# Patient Record
Sex: MALE | Race: WHITE | NOT HISPANIC OR LATINO | Employment: OTHER | URBAN - METROPOLITAN AREA
[De-identification: names, ages, dates, MRNs, and addresses within clinical notes are randomized per-mention and may not be internally consistent; named-entity substitution may affect disease eponyms.]

---

## 2023-04-04 LAB
ALANINE AMINOTRANSFERASE (SGPT) (U/L) IN SER/PLAS: 10 U/L (ref 10–52)
ALBUMIN (G/DL) IN SER/PLAS: 4.3 G/DL (ref 3.4–5)
ALKALINE PHOSPHATASE (U/L) IN SER/PLAS: 57 U/L (ref 33–136)
ANION GAP IN SER/PLAS: 14 MMOL/L (ref 10–20)
ASPARTATE AMINOTRANSFERASE (SGOT) (U/L) IN SER/PLAS: 15 U/L (ref 9–39)
BILIRUBIN TOTAL (MG/DL) IN SER/PLAS: 0.7 MG/DL (ref 0–1.2)
CALCIUM (MG/DL) IN SER/PLAS: 9.1 MG/DL (ref 8.6–10.3)
CARBON DIOXIDE, TOTAL (MMOL/L) IN SER/PLAS: 26 MMOL/L (ref 21–32)
CHLORIDE (MMOL/L) IN SER/PLAS: 106 MMOL/L (ref 98–107)
CHOLESTEROL (MG/DL) IN SER/PLAS: 117 MG/DL (ref 0–199)
CHOLESTEROL IN HDL (MG/DL) IN SER/PLAS: 51 MG/DL
CHOLESTEROL/HDL RATIO: 2.3
CREATININE (MG/DL) IN SER/PLAS: 1.15 MG/DL (ref 0.5–1.3)
GFR MALE: 67 ML/MIN/1.73M2
GLUCOSE (MG/DL) IN SER/PLAS: 95 MG/DL (ref 74–99)
LDL: 54 MG/DL (ref 0–99)
POTASSIUM (MMOL/L) IN SER/PLAS: 4 MMOL/L (ref 3.5–5.3)
PROTEIN TOTAL: 6.4 G/DL (ref 6.4–8.2)
SODIUM (MMOL/L) IN SER/PLAS: 142 MMOL/L (ref 136–145)
THYROTROPIN (MIU/L) IN SER/PLAS BY DETECTION LIMIT <= 0.05 MIU/L: 2.2 MIU/L (ref 0.44–3.98)
TRIGLYCERIDE (MG/DL) IN SER/PLAS: 59 MG/DL (ref 0–149)
UREA NITROGEN (MG/DL) IN SER/PLAS: 20 MG/DL (ref 6–23)
VLDL: 12 MG/DL (ref 0–40)

## 2023-10-23 PROBLEM — E78.2 MIXED HYPERLIPIDEMIA: Status: ACTIVE | Noted: 2023-10-23

## 2023-10-23 PROBLEM — I10 ESSENTIAL (PRIMARY) HYPERTENSION: Status: ACTIVE | Noted: 2023-10-23

## 2023-10-23 PROBLEM — R94.31 ABNORMAL ECG: Status: ACTIVE | Noted: 2023-10-23

## 2023-10-23 PROBLEM — R00.2 PALPITATIONS: Status: ACTIVE | Noted: 2023-10-23

## 2023-10-23 PROBLEM — B00.9 HERPESVIRAL INFECTION, UNSPECIFIED: Status: ACTIVE | Noted: 2017-09-29

## 2023-10-23 RX ORDER — MIRABEGRON 25 MG/1
TABLET, FILM COATED, EXTENDED RELEASE ORAL
COMMUNITY
End: 2024-04-30 | Stop reason: ALTCHOICE

## 2023-10-23 RX ORDER — TRIAMTERENE/HYDROCHLOROTHIAZID 37.5-25 MG
1 TABLET ORAL DAILY
COMMUNITY
End: 2024-04-30 | Stop reason: ALTCHOICE

## 2023-10-23 RX ORDER — PANTOPRAZOLE SODIUM 40 MG/1
1 TABLET, DELAYED RELEASE ORAL
COMMUNITY
Start: 2023-08-23 | End: 2024-04-30 | Stop reason: ALTCHOICE

## 2023-10-23 RX ORDER — LEVOTHYROXINE SODIUM 25 UG/1
TABLET ORAL
COMMUNITY
Start: 2021-11-10 | End: 2023-11-30

## 2023-10-23 RX ORDER — LISINOPRIL 20 MG/1
1 TABLET ORAL DAILY
COMMUNITY
Start: 2018-07-18 | End: 2024-02-23

## 2023-10-23 RX ORDER — DULOXETIN HYDROCHLORIDE 60 MG/1
CAPSULE, DELAYED RELEASE ORAL
COMMUNITY
Start: 2014-12-29 | End: 2024-02-23

## 2023-10-23 RX ORDER — TADALAFIL 10 MG/1
1 TABLET ORAL DAILY
COMMUNITY
End: 2024-04-30 | Stop reason: ALTCHOICE

## 2023-10-23 RX ORDER — ASPIRIN 325 MG
1 TABLET ORAL DAILY
COMMUNITY
End: 2024-04-30 | Stop reason: ALTCHOICE

## 2023-10-23 RX ORDER — GLIMEPIRIDE 4 MG/1
TABLET ORAL
COMMUNITY
End: 2024-04-30 | Stop reason: ALTCHOICE

## 2023-10-23 RX ORDER — ATORVASTATIN CALCIUM TRIHYDRATE 20 MG/1
1 TABLET, FILM COATED ORAL DAILY
COMMUNITY
Start: 2012-04-26 | End: 2024-04-30 | Stop reason: ALTCHOICE

## 2023-10-23 RX ORDER — ACYCLOVIR 400 MG/1
TABLET ORAL
COMMUNITY
Start: 2017-09-29

## 2023-10-23 RX ORDER — METFORMIN HYDROCHLORIDE 1000 MG/1
TABLET ORAL
COMMUNITY
Start: 2011-07-06 | End: 2023-12-26

## 2023-10-23 RX ORDER — LANSOPRAZOLE 30 MG/1
CAPSULE, DELAYED RELEASE ORAL
COMMUNITY
End: 2024-04-30 | Stop reason: ALTCHOICE

## 2023-10-26 ENCOUNTER — OFFICE VISIT (OUTPATIENT)
Dept: PRIMARY CARE | Facility: CLINIC | Age: 73
End: 2023-10-26
Payer: MEDICARE

## 2023-10-26 ENCOUNTER — LAB (OUTPATIENT)
Dept: LAB | Facility: LAB | Age: 73
End: 2023-10-26
Payer: COMMERCIAL

## 2023-10-26 VITALS
HEIGHT: 69 IN | BODY MASS INDEX: 32.58 KG/M2 | SYSTOLIC BLOOD PRESSURE: 124 MMHG | WEIGHT: 220 LBS | DIASTOLIC BLOOD PRESSURE: 80 MMHG | HEART RATE: 74 BPM

## 2023-10-26 DIAGNOSIS — Z79.4 TYPE 2 DIABETES MELLITUS WITHOUT COMPLICATION, WITH LONG-TERM CURRENT USE OF INSULIN (MULTI): Primary | ICD-10-CM

## 2023-10-26 DIAGNOSIS — E78.2 MIXED HYPERLIPIDEMIA: ICD-10-CM

## 2023-10-26 DIAGNOSIS — E11.9 TYPE 2 DIABETES MELLITUS WITHOUT COMPLICATION, WITH LONG-TERM CURRENT USE OF INSULIN (MULTI): ICD-10-CM

## 2023-10-26 DIAGNOSIS — K21.9 GASTROESOPHAGEAL REFLUX DISEASE WITHOUT ESOPHAGITIS: ICD-10-CM

## 2023-10-26 DIAGNOSIS — I10 ESSENTIAL (PRIMARY) HYPERTENSION: ICD-10-CM

## 2023-10-26 DIAGNOSIS — E11.9 TYPE 2 DIABETES MELLITUS WITHOUT COMPLICATION, WITH LONG-TERM CURRENT USE OF INSULIN (MULTI): Primary | ICD-10-CM

## 2023-10-26 DIAGNOSIS — Z79.4 TYPE 2 DIABETES MELLITUS WITHOUT COMPLICATION, WITH LONG-TERM CURRENT USE OF INSULIN (MULTI): ICD-10-CM

## 2023-10-26 DIAGNOSIS — R41.3 MEMORY LOSS: ICD-10-CM

## 2023-10-26 PROCEDURE — 99213 OFFICE O/P EST LOW 20 MIN: CPT | Performed by: INTERNAL MEDICINE

## 2023-10-26 PROCEDURE — 3044F HG A1C LEVEL LT 7.0%: CPT | Performed by: INTERNAL MEDICINE

## 2023-10-26 PROCEDURE — 3074F SYST BP LT 130 MM HG: CPT | Performed by: INTERNAL MEDICINE

## 2023-10-26 PROCEDURE — 1159F MED LIST DOCD IN RCRD: CPT | Performed by: INTERNAL MEDICINE

## 2023-10-26 PROCEDURE — 3079F DIAST BP 80-89 MM HG: CPT | Performed by: INTERNAL MEDICINE

## 2023-10-26 PROCEDURE — 84443 ASSAY THYROID STIM HORMONE: CPT

## 2023-10-26 PROCEDURE — 36415 COLL VENOUS BLD VENIPUNCTURE: CPT

## 2023-10-26 PROCEDURE — 80053 COMPREHEN METABOLIC PANEL: CPT

## 2023-10-26 PROCEDURE — 83036 HEMOGLOBIN GLYCOSYLATED A1C: CPT

## 2023-10-26 PROCEDURE — 3048F LDL-C <100 MG/DL: CPT | Performed by: INTERNAL MEDICINE

## 2023-10-26 PROCEDURE — 1036F TOBACCO NON-USER: CPT | Performed by: INTERNAL MEDICINE

## 2023-10-26 PROCEDURE — 4010F ACE/ARB THERAPY RXD/TAKEN: CPT | Performed by: INTERNAL MEDICINE

## 2023-10-26 PROCEDURE — 80061 LIPID PANEL: CPT

## 2023-10-26 RX ORDER — ATORVASTATIN CALCIUM 20 MG/1
20 TABLET, FILM COATED ORAL DAILY
COMMUNITY
End: 2024-01-31

## 2023-10-26 RX ORDER — BLOOD-GLUCOSE SENSOR
EACH MISCELLANEOUS
Qty: 1 EACH | Refills: 3 | Status: SHIPPED | OUTPATIENT
Start: 2023-10-26 | End: 2023-11-09

## 2023-10-26 RX ORDER — DONEPEZIL HYDROCHLORIDE 5 MG/1
5 TABLET, FILM COATED ORAL NIGHTLY
COMMUNITY

## 2023-10-26 NOTE — PROGRESS NOTES
"Subjective   Patient ID: Romie Iyer is a 73 y.o. male who presents for Follow-up (Follow up after seeing Neurology).    HPI dm  Htn  Memory   With wife today   Basically at home   Not active     HAD ALL VACCINES     Sugars are     Review of Systems  Dementia  Htn  Gerd  Lipids  depression    Objective   /80 (BP Location: Left arm, Patient Position: Sitting, BP Cuff Size: Adult)   Pulse 74   Ht 1.753 m (5' 9\")   Wt 99.8 kg (220 lb)   BMI 32.49 kg/m²     Physical Exam  Obese  Card rrr  Pulm cta  Abd soft nt bs plus   Assessment/Plan   Diagnoses and all orders for this visit:  Type 2 diabetes mellitus without complication, with long-term current use of insulin (CMS/[facility])  Comments:  dm eye exam due   i did look at sugars seem ok  Orders:  -     Comprehensive metabolic panel; Future  -     Hemoglobin A1C; Future  -     Lipid Panel; Future  -     TSH with reflex to Free T4 if abnormal; Future  -     blood-glucose sensor (FreeStyle Imani 3 Sensor) device; Aqs directed  Essential (primary) hypertension  -     Comprehensive metabolic panel; Future  -     Hemoglobin A1C; Future  -     Lipid Panel; Future  -     TSH with reflex to Free T4 if abnormal; Future  Mixed hyperlipidemia  -     Comprehensive metabolic panel; Future  -     Hemoglobin A1C; Future  -     Lipid Panel; Future  -     TSH with reflex to Free T4 if abnormal; Future  Memory loss  -     Comprehensive metabolic panel; Future  -     Hemoglobin A1C; Future  -     Lipid Panel; Future  -     TSH with reflex to Free T4 if abnormal; Future  Gastroesophageal reflux disease without esophagitis  ok       "

## 2023-10-27 LAB
ALBUMIN SERPL BCP-MCNC: 4.7 G/DL (ref 3.4–5)
ALP SERPL-CCNC: 75 U/L (ref 33–136)
ALT SERPL W P-5'-P-CCNC: 13 U/L (ref 10–52)
ANION GAP SERPL CALC-SCNC: 15 MMOL/L (ref 10–20)
AST SERPL W P-5'-P-CCNC: 16 U/L (ref 9–39)
BILIRUB SERPL-MCNC: 0.6 MG/DL (ref 0–1.2)
BUN SERPL-MCNC: 32 MG/DL (ref 6–23)
CALCIUM SERPL-MCNC: 9.6 MG/DL (ref 8.6–10.6)
CHLORIDE SERPL-SCNC: 104 MMOL/L (ref 98–107)
CHOLEST SERPL-MCNC: 151 MG/DL (ref 0–199)
CHOLESTEROL/HDL RATIO: 3.1
CO2 SERPL-SCNC: 28 MMOL/L (ref 21–32)
CREAT SERPL-MCNC: 1.22 MG/DL (ref 0.5–1.3)
EST. AVERAGE GLUCOSE BLD GHB EST-MCNC: 143 MG/DL
GFR SERPL CREATININE-BSD FRML MDRD: 63 ML/MIN/1.73M*2
GLUCOSE SERPL-MCNC: 141 MG/DL (ref 74–99)
HBA1C MFR BLD: 6.6 %
HDLC SERPL-MCNC: 49.3 MG/DL
LDLC SERPL CALC-MCNC: 90 MG/DL
NON HDL CHOLESTEROL: 102 MG/DL (ref 0–149)
POTASSIUM SERPL-SCNC: 4.7 MMOL/L (ref 3.5–5.3)
PROT SERPL-MCNC: 7.2 G/DL (ref 6.4–8.2)
SODIUM SERPL-SCNC: 142 MMOL/L (ref 136–145)
TRIGL SERPL-MCNC: 60 MG/DL (ref 0–149)
TSH SERPL-ACNC: 2.23 MIU/L (ref 0.44–3.98)
VLDL: 12 MG/DL (ref 0–40)

## 2023-11-02 ENCOUNTER — TELEPHONE (OUTPATIENT)
Dept: PRIMARY CARE | Facility: CLINIC | Age: 73
End: 2023-11-02
Payer: MEDICARE

## 2023-11-02 DIAGNOSIS — E11.9 TYPE 2 DIABETES MELLITUS WITHOUT COMPLICATION, WITHOUT LONG-TERM CURRENT USE OF INSULIN (MULTI): Primary | ICD-10-CM

## 2023-11-09 ENCOUNTER — ANCILLARY PROCEDURE (OUTPATIENT)
Dept: RADIOLOGY | Facility: CLINIC | Age: 73
End: 2023-11-09
Payer: MEDICARE

## 2023-11-09 ENCOUNTER — OFFICE VISIT (OUTPATIENT)
Dept: PRIMARY CARE | Facility: CLINIC | Age: 73
End: 2023-11-09
Payer: MEDICARE

## 2023-11-09 VITALS — HEART RATE: 82 BPM | DIASTOLIC BLOOD PRESSURE: 80 MMHG | RESPIRATION RATE: 12 BRPM | SYSTOLIC BLOOD PRESSURE: 124 MMHG

## 2023-11-09 DIAGNOSIS — R52 PAIN: Primary | ICD-10-CM

## 2023-11-09 DIAGNOSIS — R52 PAIN: ICD-10-CM

## 2023-11-09 DIAGNOSIS — M25.552 LEFT HIP PAIN: ICD-10-CM

## 2023-11-09 DIAGNOSIS — G89.29 CHRONIC LEFT-SIDED LOW BACK PAIN WITH LEFT-SIDED SCIATICA: ICD-10-CM

## 2023-11-09 DIAGNOSIS — M54.42 CHRONIC LEFT-SIDED LOW BACK PAIN WITH LEFT-SIDED SCIATICA: ICD-10-CM

## 2023-11-09 PROCEDURE — 3044F HG A1C LEVEL LT 7.0%: CPT | Performed by: NURSE PRACTITIONER

## 2023-11-09 PROCEDURE — 99213 OFFICE O/P EST LOW 20 MIN: CPT | Performed by: NURSE PRACTITIONER

## 2023-11-09 PROCEDURE — 1159F MED LIST DOCD IN RCRD: CPT | Performed by: NURSE PRACTITIONER

## 2023-11-09 PROCEDURE — 73502 X-RAY EXAM HIP UNI 2-3 VIEWS: CPT | Mod: RT

## 2023-11-09 PROCEDURE — 3048F LDL-C <100 MG/DL: CPT | Performed by: NURSE PRACTITIONER

## 2023-11-09 PROCEDURE — 3074F SYST BP LT 130 MM HG: CPT | Performed by: NURSE PRACTITIONER

## 2023-11-09 PROCEDURE — 1036F TOBACCO NON-USER: CPT | Performed by: NURSE PRACTITIONER

## 2023-11-09 PROCEDURE — 73502 X-RAY EXAM HIP UNI 2-3 VIEWS: CPT | Mod: RIGHT SIDE | Performed by: RADIOLOGY

## 2023-11-09 PROCEDURE — 72110 X-RAY EXAM L-2 SPINE 4/>VWS: CPT | Performed by: RADIOLOGY

## 2023-11-09 PROCEDURE — 72110 X-RAY EXAM L-2 SPINE 4/>VWS: CPT | Mod: FY

## 2023-11-09 PROCEDURE — 3079F DIAST BP 80-89 MM HG: CPT | Performed by: NURSE PRACTITIONER

## 2023-11-09 PROCEDURE — 4010F ACE/ARB THERAPY RXD/TAKEN: CPT | Performed by: NURSE PRACTITIONER

## 2023-11-09 RX ORDER — BLOOD-GLUCOSE SENSOR
EACH MISCELLANEOUS
Qty: 9 EACH | Refills: 3 | Status: SHIPPED | OUTPATIENT
Start: 2023-11-09

## 2023-11-09 RX ORDER — BLOOD-GLUCOSE TRANSMITTER
EACH MISCELLANEOUS
Qty: 1 EACH | Refills: 0 | Status: SHIPPED | OUTPATIENT
Start: 2023-11-09

## 2023-11-09 RX ORDER — BLOOD-GLUCOSE,RECEIVER,CONT
EACH MISCELLANEOUS
Qty: 1 EACH | Refills: 0 | Status: SHIPPED | OUTPATIENT
Start: 2023-11-09

## 2023-11-09 ASSESSMENT — ENCOUNTER SYMPTOMS
GASTROINTESTINAL NEGATIVE: 1
RESPIRATORY NEGATIVE: 1
CONSTITUTIONAL NEGATIVE: 1
CARDIOVASCULAR NEGATIVE: 1

## 2023-11-09 NOTE — TELEPHONE ENCOUNTER
Dexcom has not been sent - can you send to [facility]? In chart Not sure if it is G4 or G5? Please send today to [facility].

## 2023-11-09 NOTE — PROGRESS NOTES
Subjective   Patient ID: Romie Iyer is a 73 y.o. male.    HPI  Acute visit for dr lara  Reason for visit - follow up fall  10 days ago here with wife ana  He was going out back door leading to the [place] and   neuropathy made him miss the step and landed on his left hip  Wife fani is here and she thought better but then notes  'walking in the grocery and was sitting on the edge of the fridge' cause he said his hip still  hurt   And c/o  back pain chronic tho  Waxing and waining'  He had no limp on entering the exam room      Review of Systems   Constitutional: Negative.    Respiratory: Negative.     Cardiovascular: Negative.    Gastrointestinal: Negative.    Skin: Negative.      Visit Vitals  /80   Pulse 82   Resp 12      Objective   Physical Exam  Vitals and nursing note reviewed.   Constitutional:       Appearance: Normal appearance.   HENT:      Head: Normocephalic.   Eyes:      Pupils: Pupils are equal, round, and reactive to light.   Cardiovascular:      Rate and Rhythm: Normal rate and regular rhythm.      Heart sounds: Normal heart sounds.   Pulmonary:      Effort: Pulmonary effort is normal.      Breath sounds: Normal breath sounds.   Musculoskeletal:         General: Tenderness present. No swelling.      Comments: Neg straight leg   PPP   Skin:     General: Skin is warm and dry.   Neurological:      General: No focal deficit present.      Mental Status: He is alert and oriented to person, place, and time. Mental status is at baseline.   Psychiatric:         Mood and Affect: Mood normal.         Behavior: Behavior normal.         Thought Content: Thought content normal.         Judgment: Judgment normal.       Problem List Items Addressed This Visit    None  Visit Diagnoses         Codes    Pain    -  Primary R52    Relevant Orders    Referral to Physical Therapy    XR lumbar spine complete 4+ views (Completed)    XR hip right 2 or 3 views (Completed)    Left hip pain     M25.552    Chronic  left-sided low back pain with left-sided sciatica     M54.42, G89.29

## 2023-11-09 NOTE — TELEPHONE ENCOUNTER
Ann Marie says the insurance will cover either one of the G4 or G5. As long as its sent to [facility], the insurance will cover

## 2023-11-15 ENCOUNTER — TELEPHONE (OUTPATIENT)
Dept: PRIMARY CARE | Facility: CLINIC | Age: 73
End: 2023-11-15
Payer: MEDICARE

## 2023-11-15 NOTE — TELEPHONE ENCOUNTER
----- Message from Laura L Seaver, APRN-CNP sent at 11/14/2023  7:01 PM EST -----  Please call the patient regarding his abnormal result. Minimal changes normal for age. Start the PT I already ordered call if no help. Adal vidal

## 2023-11-22 ENCOUNTER — EVALUATION (OUTPATIENT)
Dept: PHYSICAL THERAPY | Facility: CLINIC | Age: 73
End: 2023-11-22
Payer: COMMERCIAL

## 2023-11-22 DIAGNOSIS — R26.89 BALANCE PROBLEM: ICD-10-CM

## 2023-11-22 DIAGNOSIS — R52 PAIN: Primary | ICD-10-CM

## 2023-11-22 PROCEDURE — 97161 PT EVAL LOW COMPLEX 20 MIN: CPT | Mod: GP | Performed by: PHYSICAL THERAPIST

## 2023-11-22 PROCEDURE — 97110 THERAPEUTIC EXERCISES: CPT | Mod: GP | Performed by: PHYSICAL THERAPIST

## 2023-11-22 ASSESSMENT — PAIN - FUNCTIONAL ASSESSMENT: PAIN_FUNCTIONAL_ASSESSMENT: 0-10

## 2023-11-22 ASSESSMENT — ENCOUNTER SYMPTOMS
OCCASIONAL FEELINGS OF UNSTEADINESS: 1
LOSS OF SENSATION IN FEET: 1
DEPRESSION: 0

## 2023-11-22 ASSESSMENT — PATIENT HEALTH QUESTIONNAIRE - PHQ9
2. FEELING DOWN, DEPRESSED OR HOPELESS: NOT AT ALL
1. LITTLE INTEREST OR PLEASURE IN DOING THINGS: NOT AT ALL
SUM OF ALL RESPONSES TO PHQ9 QUESTIONS 1 AND 2: 0

## 2023-11-22 ASSESSMENT — PAIN SCALES - GENERAL: PAINLEVEL_OUTOF10: 2

## 2023-11-22 NOTE — PROGRESS NOTES
Physical Therapy    Physical Therapy Lumbar/Hip Spine Evaluation    Patient Name: Romie Iyer  MRN: [id number]  Today's Date: 11/22/2023  Time Calculation  Start Time: 1629  Stop Time: 1740  Time Calculation (min): 71 min    Current Problem  Problem List Items Addressed This Visit             ICD-10-CM    Pain - Primary R52    Relevant Orders    Follow Up In Physical Therapy    Balance problem R26.89    Relevant Orders    Follow Up In Physical Therapy          Precautions  Precautions  Precautions Comment: hx of falls, neuropathy, short term memory loss       Pain  Pain Assessment: 0-10  Pain Score: 2  Pain at worst: 7  Location of pain: L hip>L knee     SUBJECTIVE:   Chief complaint:  L hip (LB?) and L knee pain   Hx of cortisone injection in knee 3/21/23  Lacks balance overall   Recent fall going into home   Neuropathy made him miss the step and landed on his left hip  Son present for eval. He notes that the pt has mentioned previous L hip pain   Would like to work on stair negotiation   +N/T (neuropathy in bilateral feet due to DM)  -B/B  -Cough/sneeze/strain    Imaging:  XR lumbar and hip 11/9/23:    FINDINGS:   Lumbar spine, five views       There is no fracture. There is minimal anterolisthesis of L4 on L5.   There is mild facet disease at L4-5 and L5-S1. There is mild   spondylitic change at the same levels..       Mild spondylosis and facet disease lower lumbar spine.   Minimal anterolisthesis of L4 on        FINDINGS:   Right hip, two views       There is no fracture. There is no dislocation. The right hip joint is   unremarkable        Normal radiographs of the right hip     Aggravating factors:  Stairs, walking, transfers    Alleviating factors:  Hot packs    Prior level of function:  Previously independent with all functional activity    Functional limitations:  Amb, transfers, stairs     Home setup:  Stairs to basement   One step to enter     Work:  Retired-[employer]     Patients  goal:  Improve strength and balance     Prior tx:  No recent tx     Objective:    Lower Extremity Strength: (WNL unless documented below)   MMT 5/5 max  RIGHT LEFT   Hip Flexion 4+ 3+ pain    Hip Extension 3+ 3+   Hip Abduction 4 4- pain    Hip Adduction 3- 3-   Knee Extension 4+ 4+   Knee Flexion 4+ 4+   Ankle DF 4+ 4+   Ankle PF 4+ 4+     Lower Extremity ROM: (WNL unless documented below)   ROM in Degrees  RIGHT LEFT   Hip Flexion  95* pain    Hip Extension     Hip Abduction     Hip Adduction   Mod loss, pain    Hip ER     Hip IR     Knee Extension 0 0   Knee Flexion 130* 130*     Lower Extremity Flexibility: (WNL unless documented below)   Flexibility  RIGHT LEFT   Quad     Hamstring  40* 39*   Hip flexor      Piriformis   Mod loss, pain    ITB      Gastroc      Soleus        Lumbar ROM:   Flexion WNL, unsteady with rising    Extension Significant loss, central LBP     RIGHT LEFT   Side Bend WNl WNL      Jl Lumbar repeated movements:   Pretest Symptoms:    RFIS NT due to time    MARTIN NT due to time            Special tests: (WNL unless documented below)   HIP RIGHT LEFT   Fadie  +   Scour  +   Esteban  +     Dermatomes: decreased sensation bilateral feet due to neuropathy  Palpation: TTP over greater trochanter    Outcome Measure:  Tinetti  Sitting Balance: Steady, safe  Arises: Able, uses arms to help  Attempts to Arise: Able to arise, one attempt  Immediate Standing Balance (First 5 Seconds): Steady but uses walker or other support  Standing Balance: Steady but wide stance, uses cane or other support  Nudged: Steady without walker or other support  Eyes Closed: Steady  Turned 360 Degrees: Steadiness: Steady  Turned 360 Degrees: Continuity of Steps: Discontinuous steps  Sitting Down: Unsafe (Misjudges distance, falls into chair)  Balance Score: 10  Initiation of Gait: No hesitancy  Step Height: R Swing Foot: Right foot complete clears floor  Step Length: R Swing Foot: Does not pass left stance foot with  step  Step Height: L Swing Foot: Left foot complete clears floor  Step Length: L Swing Foot: Passes right stance foot  Step Symmetry: Right and left step appear equal  Step Continuity: Steps appear continuous  Path: Straight without walking aid  Trunk: No sway but flexion of knees or back or spreads arms out while walking  Walking Time: Heels almost touching while walking  Gait Score: 10  Total Score: 20 Medium Fall Risk       TREATMENT:  Initial evaluation completed. Issued and reviewed HEP with pt that included:  Access Code: [email or web address]  URL: [email or web address]  Date: 11/22/2023  Prepared by: Aziza Tavarez    Exercises  - Standing Romberg to 3/4 Tandem Stance  - 1 x daily - 7 x weekly - 2 reps - 30 seconds hold  - Sit to Stand Without Arm Support  - 1 x daily - 7 x weekly - 1-2 sets - 10 reps  - Seated Isometric Hip Adduction with Ball  - 1 x daily - 7 x weekly - 2-3 sets - 10 reps - 3 seconds  hold  - Seated Hip Abduction with Resistance  - 1 x daily - 7 x weekly - 2-3 sets - 10 reps  - Beginner Bridge  - 1 x daily - 7 x weekly - 2-3 sets - 10 reps  - Supine Lower Trunk Rotation  - 1 x daily - 7 x weekly - 2-3 sets - 10 reps    Patient Education  - How to Prevent Falls    Attempted standing hip exercises but too painful.     ASSESSEMENT  The pt presents with signs and symptoms consistent with the Physical Therapy diagnosis of L hip pain>L knee pain, questionable LBP (vs isolated hip pain) and hx of fall.   Pt demonstrates bilateral LE weakness and decreased ROM in L hip with reported pain. He also had TTP over the lateral aspect of hip including greater trochanter. Difficulty in determining if pts hip pain is related to any LB px due to poor historian. Nonetheless we will continues to assess this. He is also a medium fall risk based on hx and Tinetti score.  Pt will benefit from skilled physical therapy to reduce impairments in order to return to prior level of function, reduce pain,  increase strength and ROM and improve overall posture.   The physical therapy prognosis is good for the patient to achieve their goals.   The pt tolerated therapy treatment today well with no adverse effects.  Barriers to therapy include:  memory loss, transportation (pt currently not driving and relies on family)     PLAN  The pt will be seen 1-2 time(s) a week for 6-8 weeks.      The pt (and pt's son) has been educated about the risks and benefits of physical therapy including manual therapy treatments and gives consent for treatment.     The patient will benefit from physical therapy treatment to include: therapeutic exercises, therapeutic activities, neurological re-education, manual therapy, modalities, and a home exercise program.       Goals:  Active       PT Problem       Reduce pain at worst to 2/10 with all functional and recreational activity.        Start:  11/22/23            Increase by > or = 1/2 mm grade to improve stepping up on step at home, perform transfers without increased pain/compensation         Start:  11/22/23            Increase ROM/flexibility to WFL to perform daily functional activities including transfers, stair negotiation       Start:  11/22/23            Tinetti score > 24 pts to display reduced and low risk for falls.        Start:  11/22/23            Patient will demonstrate independence (with assist from family) in home program for support of progression        Start:  11/22/23

## 2023-11-29 ENCOUNTER — TREATMENT (OUTPATIENT)
Dept: PHYSICAL THERAPY | Facility: CLINIC | Age: 73
End: 2023-11-29
Payer: COMMERCIAL

## 2023-11-29 DIAGNOSIS — R26.89 BALANCE PROBLEM: ICD-10-CM

## 2023-11-29 DIAGNOSIS — R52 PAIN: ICD-10-CM

## 2023-11-29 PROCEDURE — 97110 THERAPEUTIC EXERCISES: CPT | Mod: GP,CQ

## 2023-11-29 ASSESSMENT — PAIN - FUNCTIONAL ASSESSMENT: PAIN_FUNCTIONAL_ASSESSMENT: 0-10

## 2023-11-29 ASSESSMENT — PAIN SCALES - GENERAL: PAINLEVEL_OUTOF10: 1

## 2023-11-29 NOTE — PROGRESS NOTES
Physical Therapy Treatment    Patient Name: Romie Iyer  MRN: [id number]  Today's Date: 11/29/2023  Time Calculation  Start Time: 1700  Stop Time: 1743  Time Calculation (min): 43 min    Current Problem:  Problem List Items Addressed This Visit             ICD-10-CM    Pain R52    Balance problem R26.89       Subjective   General:   L hip is sore with Wb'ing.   L knee is feeling good today.   HEP is going well.     Pain:  Pain Assessment: 0-10  Pain Score: 1  Pain Location: Hip  Pain Orientation: Left    Precautions:  Precautions  Precautions Comment: hx of falls, neuropathy, short term memory loss    Objective   No objective measures taken this visit    Treatment:  Therapeutic exercise  Nustep L4 x 5 min  Seated BL HSS x 1 min  Seated BL piriformis stretch x 1 min  Seated marches x 1 min  Sit to stand from elevated plinth 2x10   HR/TR x10   Step up fwd BL 4'' x10  Reverse step up/down 4'' x5  LAQ 1# 2x10    Neuromuscular Re-education   L AE step up x10 , UE support   AE NBOS x 1 min       Manual       Modalities      Assessment   Pt fatigued with session, requiring seated rest breaks throughout session. Voiced some mild L hip pain with Wb'ing exercises but tolerable and able to complete.    Unsteadiness with AE actfiviites with LOB when stepping up, requiring UE support and min assist from therapist. Voiced feeling good post tx but fatigued.     Plan    Continue to progress POC as tolerated by patient to improve strength, mobility and overall function    Goals:  Active       PT Problem       Reduce pain at worst to 2/10 with all functional and recreational activity.        Start:  11/22/23            Increase by > or = 1/2 mm grade to improve stepping up on step at home, perform transfers without increased pain/compensation         Start:  11/22/23            Increase ROM/flexibility to WFL to perform daily functional activities including transfers, stair negotiation       Start:  11/22/23            Tinetti score  > 24 pts to display reduced and low risk for falls.        Start:  11/22/23            Patient will demonstrate independence (with assist from family) in home program for support of progression        Start:  11/22/23

## 2023-11-30 DIAGNOSIS — E03.9 HYPOTHYROIDISM, UNSPECIFIED TYPE: Primary | ICD-10-CM

## 2023-11-30 RX ORDER — LEVOTHYROXINE SODIUM 25 UG/1
TABLET ORAL
Qty: 90 TABLET | Refills: 3 | Status: SHIPPED | OUTPATIENT
Start: 2023-11-30

## 2023-12-04 ENCOUNTER — TREATMENT (OUTPATIENT)
Dept: PHYSICAL THERAPY | Facility: CLINIC | Age: 73
End: 2023-12-04
Payer: COMMERCIAL

## 2023-12-04 DIAGNOSIS — R26.89 BALANCE PROBLEM: ICD-10-CM

## 2023-12-04 DIAGNOSIS — R52 PAIN: ICD-10-CM

## 2023-12-04 PROCEDURE — 97110 THERAPEUTIC EXERCISES: CPT | Mod: GP,CQ

## 2023-12-04 ASSESSMENT — PAIN - FUNCTIONAL ASSESSMENT: PAIN_FUNCTIONAL_ASSESSMENT: 0-10

## 2023-12-04 ASSESSMENT — PAIN SCALES - GENERAL: PAINLEVEL_OUTOF10: 0 - NO PAIN

## 2023-12-06 ENCOUNTER — TREATMENT (OUTPATIENT)
Dept: PHYSICAL THERAPY | Facility: CLINIC | Age: 73
End: 2023-12-06
Payer: COMMERCIAL

## 2023-12-06 DIAGNOSIS — R26.89 BALANCE PROBLEM: ICD-10-CM

## 2023-12-06 DIAGNOSIS — R52 PAIN: ICD-10-CM

## 2023-12-06 PROCEDURE — 97110 THERAPEUTIC EXERCISES: CPT | Mod: GP,CQ

## 2023-12-06 PROCEDURE — 97112 NEUROMUSCULAR REEDUCATION: CPT | Mod: GP,CQ

## 2023-12-06 ASSESSMENT — PAIN SCALES - GENERAL: PAINLEVEL_OUTOF10: 0 - NO PAIN

## 2023-12-06 ASSESSMENT — PAIN - FUNCTIONAL ASSESSMENT: PAIN_FUNCTIONAL_ASSESSMENT: 0-10

## 2023-12-06 NOTE — PROGRESS NOTES
Physical Therapy Treatment    Patient Name: Romie Iyer  MRN: [id number]  Today's Date: 12/6/2023  Time Calculation  Start Time: 1740  Stop Time: 1825  Time Calculation (min): 45 min    Current Problem:  Problem List Items Addressed This Visit             ICD-10-CM    Pain R52    Balance problem R26.89       Subjective   General:   Pt reports L hip is feeling better overall. No pain today but does get reminders of pain at times.   Not waking up d/t hip pain recently.     Pain:  Pain Assessment: 0-10  Pain Score: 0 - No pain  Pain Location: Hip  Pain Orientation: Left    Precautions:  Precautions  Precautions Comment: hx of falls, neuropathy, short term memory loss    Objective   No objective measures taken this visit    Treatment:  Therapeutic exercise  Nustep L4 x 5 min  Seated BL HSS x 1 min  Seated BL piriformis stretch x 1 min  Seated marches x 1 min  Sit to stand from elevated plinth 2x10   HR/TR 2 x10   Step up fwd BL 4'' x10  Reverse step up/down 4'' R x10, L x5 discontinue d/t pain   LAQ 1# 2x10  Ascend/descend stairs in lobby x 2 flights , 1 UE support , SBA  Lat walking in middle of gym, CGA held   Monster walk in middle of gym, CGA held    Neuromuscular Re-education   L AE step up x10 , UE support   AE NBOS x 1 min   Olive (small)-> AE-> olive-> AE along railing x2 L back and forth   Negotiating obstacles in lobby (waving in/out of chairs), SBA     Manual       Modalities      Assessment   Pt overall tolerated tx well, demonstrating improved endurance and strength with exercises. Did well negotiating stairs in lobby with reciprocal pattern.   Pt also did well with negotiating obstacles with good awareness to surrounding objects.  L hip pain with reverse step up so discontinued.   Plan    Continue to progress POC as tolerated by patient to improve strength, mobility and overall function    Goals:  Active       PT Problem       Reduce pain at worst to 2/10 with all functional and recreational activity.         Start:  11/22/23            Increase by > or = 1/2 mm grade to improve stepping up on step at home, perform transfers without increased pain/compensation         Start:  11/22/23            Increase ROM/flexibility to WFL to perform daily functional activities including transfers, stair negotiation       Start:  11/22/23            Tinetti score > 24 pts to display reduced and low risk for falls.        Start:  11/22/23            Patient will demonstrate independence (with assist from family) in home program for support of progression        Start:  11/22/23

## 2023-12-11 ENCOUNTER — TREATMENT (OUTPATIENT)
Dept: PHYSICAL THERAPY | Facility: CLINIC | Age: 73
End: 2023-12-11
Payer: COMMERCIAL

## 2023-12-11 DIAGNOSIS — R52 PAIN: ICD-10-CM

## 2023-12-11 DIAGNOSIS — R26.89 BALANCE PROBLEM: ICD-10-CM

## 2023-12-11 PROCEDURE — 97140 MANUAL THERAPY 1/> REGIONS: CPT | Mod: GP,CQ

## 2023-12-11 PROCEDURE — 97110 THERAPEUTIC EXERCISES: CPT | Mod: GP,CQ

## 2023-12-11 ASSESSMENT — PAIN SCALES - GENERAL: PAINLEVEL_OUTOF10: 0 - NO PAIN

## 2023-12-11 ASSESSMENT — PAIN - FUNCTIONAL ASSESSMENT: PAIN_FUNCTIONAL_ASSESSMENT: 0-10

## 2023-12-11 NOTE — PROGRESS NOTES
Physical Therapy Treatment    Patient Name: Romie Iyer  MRN: [id number]  Today's Date: 12/11/2023  Time Calculation  Start Time: 1750  Stop Time: 1830  Time Calculation (min): 40 min    Current Problem:  Problem List Items Addressed This Visit             ICD-10-CM    Pain R52    Balance problem R26.89         Subjective   General:   Pt reports L hip is feeling better, has not noticed pain recently.   Feels like balance and strength is improving as well.     Pain:  Pain Assessment: 0-10  Pain Score: 0 - No pain  Pain Location: Hip  Pain Orientation: Left    Precautions:  Precautions  Precautions Comment: hx of falls, neuropathy, short term memory loss    Objective   No objective measures taken this visit    Treatment:  Therapeutic exercise  Nustep L4 x 5 min/Upright bike seat 5 L2 x 5 min   Seated BL HSS x 1 min  Seated BL piriformis stretch x 1 min  Seated marches x 1 min  Sit to stand from elevated plinth 2x10   HR/TR 2 x10   Step up fwd BL 6'' 2x10  Reverse step up/down 4'' R x10, L x10  Ascend/descend stairs in lobby x 2 flights , 1 UE support , SBA- held today   Lat walking in middle of gym 20 ftx 2 , CGA   Monster walk in middle of gym 20 ft x 2  CGA     Neuromuscular Re-education   L AE step up x10 , UE support   AE mod tandem x 1 min   Olive (small)-> AE-> olive-> AE along railing x2 L back and forth   Negotiating obstacles in lobby (waving in/out of chairs), SBA     Manual       Modalities      Assessment   Pt overall progressing well with therex and balance activities. Progressed static balance but pt does demonstrate difficulty and unsteadiness with step ups onto AE, requiring UE support and Min A.  No hip pain with reverse step ups this visit but still demonstrated weakness.  No complaints of pain during or post tx.   Plan    Continue to progress POC as tolerated by patient to improve strength, mobility and overall function    Goals:  Active       PT Problem       Reduce pain at worst to 2/10 with  all functional and recreational activity.        Start:  11/22/23            Increase by > or = 1/2 mm grade to improve stepping up on step at home, perform transfers without increased pain/compensation         Start:  11/22/23            Increase ROM/flexibility to WFL to perform daily functional activities including transfers, stair negotiation       Start:  11/22/23            Tinetti score > 24 pts to display reduced and low risk for falls.        Start:  11/22/23            Patient will demonstrate independence (with assist from family) in home program for support of progression        Start:  11/22/23

## 2023-12-12 ENCOUNTER — CLINICAL SUPPORT (OUTPATIENT)
Dept: PRIMARY CARE | Facility: CLINIC | Age: 73
End: 2023-12-12
Payer: MEDICARE

## 2023-12-12 PROCEDURE — G0009 ADMIN PNEUMOCOCCAL VACCINE: HCPCS | Performed by: INTERNAL MEDICINE

## 2023-12-12 PROCEDURE — 90677 PCV20 VACCINE IM: CPT | Performed by: INTERNAL MEDICINE

## 2023-12-13 ENCOUNTER — TREATMENT (OUTPATIENT)
Dept: PHYSICAL THERAPY | Facility: CLINIC | Age: 73
End: 2023-12-13
Payer: COMMERCIAL

## 2023-12-13 DIAGNOSIS — R26.89 BALANCE PROBLEM: ICD-10-CM

## 2023-12-13 DIAGNOSIS — R52 PAIN: ICD-10-CM

## 2023-12-13 PROCEDURE — 97110 THERAPEUTIC EXERCISES: CPT | Mod: GP,CQ

## 2023-12-13 PROCEDURE — 97112 NEUROMUSCULAR REEDUCATION: CPT | Mod: GP,CQ

## 2023-12-13 ASSESSMENT — PAIN SCALES - GENERAL: PAINLEVEL_OUTOF10: 0 - NO PAIN

## 2023-12-13 ASSESSMENT — PAIN - FUNCTIONAL ASSESSMENT: PAIN_FUNCTIONAL_ASSESSMENT: 0-10

## 2023-12-13 NOTE — PROGRESS NOTES
Physical Therapy Treatment    Patient Name: oRmie Iyer  MRN: [id number]  Today's Date: 12/13/2023  Time Calculation  Start Time: 1750  Stop Time: 1830  Time Calculation (min): 40 min    Current Problem:  Problem List Items Addressed This Visit             ICD-10-CM    Pain R52    Balance problem R26.89           Subjective   General:   Pt reports L hip is feeling better overall. Still feels pain occasionally, apolinar with stairs.   Feels like balance and strength is improving as well, not catching his foot on things as much.     Pain:  Pain Assessment: 0-10  Pain Score: 0 - No pain  Pain Location: Hip  Pain Orientation: Left    Precautions:  Precautions  Precautions Comment: hx of falls, neuropathy, short term memory loss    Objective   No objective measures taken this visit    Treatment:  Therapeutic exercise  Nustep L4 x 5 min/Upright bike seat 5 L2 x 5 min   Seated BL HSS x 1 min  Seated BL piriformis stretch x 1 min  Seated marches x 1 min  Sit to stand from elevated plinth 2x10   HR/TR 2 x10   Step up fwd BL 6'' 2x10  Reverse step up/down 4'' R x10, L x10  Ascend/descend stairs in lobby x 2 flights , 1 UE support , SBA- held today   Lat walking at railing   Monster walk at railing back and forth x 2 L , no UE support YTB   Neuromuscular Re-education   L AE step up 2x10 , min to no UE support   AE mod tandem x 1 min ea   Olive (small)-> AE-> olive-> AE along railing x2 L back and forth       Manual       Modalities      Assessment   Pt continues to progress well with tx. Demonstrated increased strength and improved balance during activitie but still requires CGA-Erickson for unsteadiness and to decrease falls. Difficulty with steps and stepping over objects d/t weakness. No reports of pain during or post tx.     Plan    Continue to progress POC as tolerated by patient to improve strength, mobility and overall function    Goals:  Active       PT Problem       Reduce pain at worst to 2/10 with all functional and  recreational activity.        Start:  11/22/23            Increase by > or = 1/2 mm grade to improve stepping up on step at home, perform transfers without increased pain/compensation         Start:  11/22/23            Increase ROM/flexibility to WFL to perform daily functional activities including transfers, stair negotiation       Start:  11/22/23            Tinetti score > 24 pts to display reduced and low risk for falls.        Start:  11/22/23            Patient will demonstrate independence (with assist from family) in home program for support of progression        Start:  11/22/23

## 2023-12-18 ENCOUNTER — DOCUMENTATION (OUTPATIENT)
Dept: PHYSICAL THERAPY | Facility: CLINIC | Age: 73
End: 2023-12-18
Payer: MEDICARE

## 2023-12-18 ENCOUNTER — APPOINTMENT (OUTPATIENT)
Dept: PHYSICAL THERAPY | Facility: CLINIC | Age: 73
End: 2023-12-18
Payer: COMMERCIAL

## 2023-12-18 NOTE — PROGRESS NOTES
Physical Therapy                 Therapy Communication Note    Patient Name: Romie Iyer  MRN: [id number]  Today's Date: 12/18/2023     Discipline: Physical Therapy    Missed Visit Reason:      Missed Time: Cancel    Comment: Pt cx d/t being ill

## 2023-12-20 ENCOUNTER — TREATMENT (OUTPATIENT)
Dept: PHYSICAL THERAPY | Facility: CLINIC | Age: 73
End: 2023-12-20
Payer: COMMERCIAL

## 2023-12-20 DIAGNOSIS — R26.89 BALANCE PROBLEM: ICD-10-CM

## 2023-12-20 DIAGNOSIS — R52 PAIN: Primary | ICD-10-CM

## 2023-12-20 PROCEDURE — 97112 NEUROMUSCULAR REEDUCATION: CPT | Mod: GP,CQ

## 2023-12-20 PROCEDURE — 97110 THERAPEUTIC EXERCISES: CPT | Mod: GP,CQ

## 2023-12-20 ASSESSMENT — PAIN SCALES - GENERAL: PAINLEVEL_OUTOF10: 0 - NO PAIN

## 2023-12-20 ASSESSMENT — PAIN - FUNCTIONAL ASSESSMENT: PAIN_FUNCTIONAL_ASSESSMENT: 0-10

## 2023-12-20 NOTE — PROGRESS NOTES
Physical Therapy Treatment    Patient Name: Romie Iyer  MRN: [id number]  Today's Date: 12/20/2023  Time Calculation  Start Time: 1705  Stop Time: 1745  Time Calculation (min): 40 min    Current Problem:  Problem List Items Addressed This Visit             ICD-10-CM    Pain - Primary R52    Balance problem R26.89       Subjective   General:   L Hip has been feeling good, no pain.   Feels like balance is improving as well. It helps that he has not been experiencing knee or hip pain recently.   Pain:  Pain Assessment: 0-10  Pain Score: 0 - No pain  Pain Location: Hip  Pain Orientation: Left    Precautions:  Precautions  Precautions Comment: hx of falls, neuropathy, short term memory loss    Objective   No objective measures taken this visit    Treatment:  Therapeutic exercise  Nustep L4 x 5 min/Upright bike seat 5 L2 x 5 min   Seated BL HSS x 1 min  Seated BL piriformis stretch x 1 min  Seated marches x 1.5  min  Sit to stand from elevated plinth 2x10   HR/TR on 1/2 foam 2 x10   Step up fwd BL 6'' 2x10- held   Reverse step up/down 4'' R x10, L x10- held   Ascend/descend stairs in lobby x 3 flights , 1 UE support , SBA  Lat walking at railing   Monster walk at railing back and forth x 2 L , no UE support YTB   Alt fwd lunges x5 1 UE support    Neuromuscular Re-education   L AE step up 2x10 , min to no UE support   AE mod tandem x 1 min ea   Olive (small)-> AE-> olive-> AE along railing x2 L back and forth       Manual       Modalities      Assessment   Pt progressing with strength and balance activities. Does not require nearly as many seated rest breaks than previous sessions. Only required 1 seated rest break the entire session today.  L knee pain with alt lunges so mod to mid, pain free range. Requires cues to decrease UE support on steps d/t pulling himself up with hands vs using LE's. Overall great session.     Plan    Continue to progress POC as tolerated by patient to improve strength, mobility and overall  function    Goals:  Active       PT Problem       Reduce pain at worst to 2/10 with all functional and recreational activity.        Start:  11/22/23            Increase by > or = 1/2 mm grade to improve stepping up on step at home, perform transfers without increased pain/compensation         Start:  11/22/23            Increase ROM/flexibility to WFL to perform daily functional activities including transfers, stair negotiation       Start:  11/22/23            Tinetti score > 24 pts to display reduced and low risk for falls.        Start:  11/22/23            Patient will demonstrate independence (with assist from family) in home program for support of progression        Start:  11/22/23

## 2023-12-25 DIAGNOSIS — E11.65 TYPE 2 DIABETES MELLITUS WITH HYPERGLYCEMIA, WITHOUT LONG-TERM CURRENT USE OF INSULIN (MULTI): Primary | ICD-10-CM

## 2023-12-26 RX ORDER — GLIMEPIRIDE 2 MG/1
2 TABLET ORAL DAILY
Qty: 90 TABLET | Refills: 1 | Status: SHIPPED | OUTPATIENT
Start: 2023-12-26 | End: 2024-03-21 | Stop reason: SDUPTHER

## 2023-12-26 RX ORDER — METFORMIN HYDROCHLORIDE 1000 MG/1
1000 TABLET ORAL DAILY
Qty: 90 TABLET | Refills: 1 | Status: SHIPPED | OUTPATIENT
Start: 2023-12-26 | End: 2024-03-21 | Stop reason: SDUPTHER

## 2023-12-27 ENCOUNTER — TREATMENT (OUTPATIENT)
Dept: PHYSICAL THERAPY | Facility: CLINIC | Age: 73
End: 2023-12-27
Payer: COMMERCIAL

## 2023-12-27 DIAGNOSIS — R26.89 BALANCE PROBLEM: ICD-10-CM

## 2023-12-27 DIAGNOSIS — R52 PAIN: ICD-10-CM

## 2023-12-27 PROCEDURE — 97110 THERAPEUTIC EXERCISES: CPT | Mod: GP | Performed by: PHYSICAL THERAPIST

## 2023-12-27 ASSESSMENT — PAIN SCALES - GENERAL: PAINLEVEL_OUTOF10: 0 - NO PAIN

## 2023-12-27 ASSESSMENT — PAIN - FUNCTIONAL ASSESSMENT: PAIN_FUNCTIONAL_ASSESSMENT: 0-10

## 2023-12-27 NOTE — PROGRESS NOTES
Physical Therapy Treatment    Patient Name: Romie Iyer  MRN: [id number]  Today's Date: 12/27/2023  Time Calculation  Start Time: 1704  Stop Time: 1747  Time Calculation (min): 43 min    Current Problem:  Problem List Items Addressed This Visit             ICD-10-CM    Pain R52    Balance problem R26.89       Subjective   General:   L Hip has been feeling good, no pain.   Denies loss of balance.     Pain:  Pain Assessment: 0-10  Pain Score: 0 - No pain  Pain Location: Hip  Pain Orientation: Left    Precautions:  Precautions  Precautions Comment: hx of falls, neuropathy, short term memory loss    Objective     Lower Extremity Strength: (WNL unless documented below)   MMT 5/5 max  RIGHT LEFT   Hip Flexion 5 4 thigh pain    Hip Extension 4 4   Hip Abduction 4+ 4+   Hip Adduction 4+ 4+   Knee Extension 5 5   Knee Flexion 5 5   Ankle DF 4+ 4+   Ankle PF 4+ 4+      Lower Extremity ROM: (WNL unless documented below)   ROM in Degrees  RIGHT LEFT   Hip Flexion   110 without pain    Hip Extension       Hip Abduction       Hip Adduction    Mod loss, pain    Hip ER       Hip IR          Lower Extremity Flexibility: (WNL unless documented below)   Flexibility  RIGHT LEFT   Quad       Hamstring  32* 32*   Hip flexor        Piriformis    WNL    ITB        Gastroc        Soleus                Outcome Measure:  Tinetti  Sitting Balance: Steady, safe  Arises: Able without using arms  Attempts to Arise: Able to arise, one attempt  Immediate Standing Balance (First 5 Seconds): Steady without walker or other support  Standing Balance: Steady but wide stance, uses cane or other support  Nudged: Steady without walker or other support  Eyes Closed: Unsteady  Turned 360 Degrees: Steadiness: Steady  Turned 360 Degrees: Continuity of Steps: Continuous  Sitting Down: Safe, smooth motion  Balance Score: 14  Initiation of Gait: No hesitancy  Step Height: R Swing Foot: Right foot complete clears floor  Step Length: R Swing Foot: Does not pass  left stance foot with step  Step Height: L Swing Foot: Left foot complete clears floor  Step Length: L Swing Foot: Does not pass right stance foot with step  Step Symmetry: Right and left step appear equal  Step Continuity: Steps appear continuous  Path: Straight without walking aid  Trunk: No sway, no flexion, no use of arms, no walking aid  Walking Time: Heels almost touching while walking  Gait Score: 10  Total Score: 24      Treatment:  Therapeutic exercise  Nustep L4 x 5 min/Upright bike seat 5 L2 x 5 min     Recheck performed     Seated BL HSS x 1 min  Seated BL piriformis stretch x 1 min  Sit to stand from elevated plinth 2x10     Ascend/descend stairs in lobby x 3 flights , 1 UE support , SBA  Lat walking at railing YTB x 2 laps, No UE support   Monster walk at railing back and forth x 2 L , no UE support YTB     HEP updated:   Access Code: [email or web address]  URL: [email or web address]  Date: 12/27/2023  Prepared by: Aziza Tavarez    Exercises  - Seated Hamstring Stretch  - 1 x daily - 7 x weekly - 1 minute hold  - Seated Piriformis Stretch  - 1 x daily - 7 x weekly - 1 minute hold  - Standing Romberg to 3/4 Tandem Stance  - 1 x daily - 7 x weekly - 2 reps - 30 seconds hold  - Sit to Stand Without Arm Support  - 1 x daily - 7 x weekly - 1-2 sets - 10 reps  - Seated Isometric Hip Adduction with Ball  - 1 x daily - 7 x weekly - 2-3 sets - 10 reps - 3 seconds  hold  - Seated Hip Abduction with Resistance  - 1 x daily - 7 x weekly - 2-3 sets - 10 reps  - Beginner Bridge  - 1 x daily - 7 x weekly - 2-3 sets - 10 reps  - Supine Lower Trunk Rotation  - 1 x daily - 7 x weekly - 2-3 sets - 10 reps  - Side Stepping with Resistance at Ankles and Counter Support  - 1 x daily - 3-4 x weekly - 2-3 sets - 10 reps  - Forward and Backward Monster Walk with Resistance at Ankles and Counter Support  - 1 x daily - 3-4 x weekly - 2-3 sets - 10 reps  - Hip Abduction with Resistance Loop  - 1 x daily - 3-4 x weekly  - 1-2 sets - 10 reps  - Hip Extension with Resistance Loop  - 1 x daily - 3-4 x weekly - 1-2 sets - 10 reps  - Standing Hip Flexion with Resistance Loop  - 1 x daily - 3-4 x weekly - 1-2 sets - 10 reps    Patient Education  - How to Prevent Falls    Assessment   Pt demonstrates much improved strength and ROM without the same reported pain as initial eval.  Overall he has made great progression toward functional goals and is appropriate for discharge with continued compliance with updated HEP.     Plan    Discharge to [facility].     Goals:  Active       PT Problem       Reduce pain at worst to 2/10 with all functional and recreational activity.  (Met)       Start:  11/22/23    Expected End:  02/20/24    Resolved:  12/27/23         Increase by > or = 1/2 mm grade to improve stepping up on step at home, perform transfers without increased pain/compensation   (Met)       Start:  11/22/23    Expected End:  02/20/24    Resolved:  12/27/23         Increase ROM/flexibility to WFL to perform daily functional activities including transfers, stair negotiation (Met)       Start:  11/22/23    Expected End:  02/20/24    Resolved:  12/27/23         Tinetti score > 24 pts to display reduced and low risk for falls.  (Progressing)       Start:  11/22/23    Expected End:  02/20/24            Patient will demonstrate independence (with assist from family) in home program for support of progression  (Met)       Start:  11/22/23    Expected End:  02/20/24    Resolved:  12/27/23      Goal Note       Patient will demonstrate independence in home program for support of progression

## 2024-01-31 DIAGNOSIS — E78.2 MIXED HYPERLIPIDEMIA: Primary | ICD-10-CM

## 2024-01-31 RX ORDER — ATORVASTATIN CALCIUM 20 MG/1
TABLET, FILM COATED ORAL
Qty: 90 TABLET | Refills: 3 | Status: SHIPPED | OUTPATIENT
Start: 2024-01-31

## 2024-02-23 DIAGNOSIS — F41.9 ANXIETY: Primary | ICD-10-CM

## 2024-02-23 DIAGNOSIS — I10 ESSENTIAL (PRIMARY) HYPERTENSION: Primary | ICD-10-CM

## 2024-02-23 RX ORDER — LISINOPRIL 20 MG/1
20 TABLET ORAL 2 TIMES DAILY
Qty: 180 TABLET | Refills: 1 | Status: SHIPPED | OUTPATIENT
Start: 2024-02-23 | End: 2024-03-18 | Stop reason: SDUPTHER

## 2024-02-23 RX ORDER — DULOXETIN HYDROCHLORIDE 60 MG/1
60 CAPSULE, DELAYED RELEASE ORAL DAILY
Qty: 90 CAPSULE | Refills: 1 | Status: SHIPPED | OUTPATIENT
Start: 2024-02-23 | End: 2024-03-21 | Stop reason: SDUPTHER

## 2024-03-18 DIAGNOSIS — I10 ESSENTIAL (PRIMARY) HYPERTENSION: ICD-10-CM

## 2024-03-18 RX ORDER — LISINOPRIL 20 MG/1
20 TABLET ORAL 2 TIMES DAILY
Qty: 180 TABLET | Refills: 1 | Status: SHIPPED | OUTPATIENT
Start: 2024-03-18

## 2024-03-21 ENCOUNTER — TELEPHONE (OUTPATIENT)
Dept: PRIMARY CARE | Facility: CLINIC | Age: 74
End: 2024-03-21
Payer: MEDICARE

## 2024-03-21 DIAGNOSIS — F41.9 ANXIETY: ICD-10-CM

## 2024-03-21 DIAGNOSIS — E11.65 TYPE 2 DIABETES MELLITUS WITH HYPERGLYCEMIA, WITHOUT LONG-TERM CURRENT USE OF INSULIN (MULTI): ICD-10-CM

## 2024-03-21 RX ORDER — METFORMIN HYDROCHLORIDE 1000 MG/1
1000 TABLET ORAL DAILY
Qty: 90 TABLET | Refills: 3 | Status: SHIPPED | OUTPATIENT
Start: 2024-03-21

## 2024-03-21 RX ORDER — DULOXETIN HYDROCHLORIDE 60 MG/1
60 CAPSULE, DELAYED RELEASE ORAL DAILY
Qty: 90 CAPSULE | Refills: 3 | Status: SHIPPED | OUTPATIENT
Start: 2024-03-21

## 2024-03-21 RX ORDER — GLIMEPIRIDE 2 MG/1
2 TABLET ORAL DAILY
Qty: 90 TABLET | Refills: 3 | Status: SHIPPED | OUTPATIENT
Start: 2024-03-21

## 2024-03-21 NOTE — TELEPHONE ENCOUNTER
USING [facility] MAIL ORDER - needs all scripts from them .    Glimperide 2mg , Duloxetine, and Metformin all need to be sent to them .  All his scripts will then be from [facility].

## 2024-04-30 ENCOUNTER — OFFICE VISIT (OUTPATIENT)
Dept: PRIMARY CARE | Facility: CLINIC | Age: 74
End: 2024-04-30
Payer: MEDICARE

## 2024-04-30 VITALS
HEART RATE: 76 BPM | BODY MASS INDEX: 32.88 KG/M2 | SYSTOLIC BLOOD PRESSURE: 128 MMHG | HEIGHT: 69 IN | DIASTOLIC BLOOD PRESSURE: 88 MMHG | WEIGHT: 222 LBS

## 2024-04-30 DIAGNOSIS — Z00.00 WELLNESS EXAMINATION: ICD-10-CM

## 2024-04-30 DIAGNOSIS — Z12.5 PROSTATE CANCER SCREENING: ICD-10-CM

## 2024-04-30 DIAGNOSIS — I10 PRIMARY HYPERTENSION: ICD-10-CM

## 2024-04-30 DIAGNOSIS — E78.2 MIXED HYPERLIPIDEMIA: ICD-10-CM

## 2024-04-30 DIAGNOSIS — Z79.4 TYPE 2 DIABETES MELLITUS WITHOUT COMPLICATION, WITH LONG-TERM CURRENT USE OF INSULIN (MULTI): ICD-10-CM

## 2024-04-30 DIAGNOSIS — I10 ESSENTIAL (PRIMARY) HYPERTENSION: Primary | ICD-10-CM

## 2024-04-30 DIAGNOSIS — R41.3 MEMORY LOSS: ICD-10-CM

## 2024-04-30 DIAGNOSIS — K21.9 GASTROESOPHAGEAL REFLUX DISEASE WITHOUT ESOPHAGITIS: ICD-10-CM

## 2024-04-30 DIAGNOSIS — E11.9 TYPE 2 DIABETES MELLITUS WITHOUT COMPLICATION, WITH LONG-TERM CURRENT USE OF INSULIN (MULTI): ICD-10-CM

## 2024-04-30 LAB — POC HEMOGLOBIN A1C: 6.2 % (ref 4.2–6.5)

## 2024-04-30 PROCEDURE — 3074F SYST BP LT 130 MM HG: CPT | Performed by: INTERNAL MEDICINE

## 2024-04-30 PROCEDURE — 3079F DIAST BP 80-89 MM HG: CPT | Performed by: INTERNAL MEDICINE

## 2024-04-30 PROCEDURE — 1159F MED LIST DOCD IN RCRD: CPT | Performed by: INTERNAL MEDICINE

## 2024-04-30 PROCEDURE — 83036 HEMOGLOBIN GLYCOSYLATED A1C: CPT | Performed by: INTERNAL MEDICINE

## 2024-04-30 PROCEDURE — 4010F ACE/ARB THERAPY RXD/TAKEN: CPT | Performed by: INTERNAL MEDICINE

## 2024-04-30 PROCEDURE — 99213 OFFICE O/P EST LOW 20 MIN: CPT | Performed by: INTERNAL MEDICINE

## 2024-04-30 RX ORDER — CARBIDOPA AND LEVODOPA 25; 100 MG/1; MG/1
TABLET ORAL
COMMUNITY
Start: 2024-04-18

## 2024-04-30 RX ORDER — ASPIRIN 81 MG/1
81 TABLET ORAL DAILY
COMMUNITY

## 2024-04-30 NOTE — PROGRESS NOTES
"Subjective   Patient ID: Romie Iyer is a 73 y.o. male who presents for Follow-up (6 MONTH FOLLOW UP).    HPI DOING WELL SLEEP WAS AN ISSUES FOR RLS   SINEMET FOR RLS   SUGARS REVIEWED  AL OK   MOOD IS GOOD   Review of Systems  COLON 6/21/22  Objective   /88   Pulse 76   Ht 1.753 m (5' 9\")   Wt 101 kg (222 lb)   BMI 32.78 kg/m²     Physical Exam  NAD  CARD RRR  PULM CTA  ABD NEG   EXT  NL    Assessment/Plan   Diagnoses and all orders for this visit:  Essential (primary) hypertension  Comments:  GOOD  Orders:  -     TSH with reflex to Free T4 if abnormal; Future  -     Urinalysis with Reflex Culture and Microscopic  -     Albumin , Urine Random; Future  Type 2 diabetes mellitus without complication, with long-term current use of insulin (Multi)  Comments:  CONTROLLED  Orders:  -     TSH with reflex to Free T4 if abnormal; Future  -     Urinalysis with Reflex Culture and Microscopic  -     Albumin , Urine Random; Future  Memory loss  Comments:  FAIR TO STABLE  Orders:  -     TSH with reflex to Free T4 if abnormal; Future  -     Urinalysis with Reflex Culture and Microscopic  -     Albumin , Urine Random; Future  Gastroesophageal reflux disease without esophagitis  Comments:  ALL OK  Orders:  -     TSH with reflex to Free T4 if abnormal; Future  -     Urinalysis with Reflex Culture and Microscopic  -     Albumin , Urine Random; Future  Mixed hyperlipidemia  Comments:  OK  Orders:  -     TSH with reflex to Free T4 if abnormal; Future  -     Urinalysis with Reflex Culture and Microscopic  -     Albumin , Urine Random; Future  Wellness examination  -     CBC; Future  -     Lipid Panel; Future  -     Comprehensive Metabolic Panel; Future  -     TSH with reflex to Free T4 if abnormal; Future  -     Urinalysis with Reflex Culture and Microscopic  -     Albumin , Urine Random; Future  Prostate cancer screening  -     Prostate Specific Antigen, Screen; Future  -     TSH with reflex to Free T4 if abnormal; " Future  -     Urinalysis with Reflex Culture and Microscopic  -     Albumin , Urine Random; Future  Primary hypertension  -     CBC; Future  Other orders  -     Follow Up In Primary Care - Established

## 2024-06-29 DIAGNOSIS — E11.9 TYPE 2 DIABETES MELLITUS WITHOUT COMPLICATION, WITHOUT LONG-TERM CURRENT USE OF INSULIN (MULTI): ICD-10-CM

## 2024-07-01 RX ORDER — BLOOD-GLUCOSE TRANSMITTER
EACH MISCELLANEOUS
Qty: 30 EACH | Refills: 3 | Status: SHIPPED | OUTPATIENT
Start: 2024-07-01

## 2024-08-20 ENCOUNTER — LAB (OUTPATIENT)
Dept: LAB | Facility: LAB | Age: 74
End: 2024-08-20
Payer: MEDICARE

## 2024-08-20 DIAGNOSIS — I10 PRIMARY HYPERTENSION: ICD-10-CM

## 2024-08-20 DIAGNOSIS — Z79.4 TYPE 2 DIABETES MELLITUS WITHOUT COMPLICATION, WITH LONG-TERM CURRENT USE OF INSULIN (MULTI): ICD-10-CM

## 2024-08-20 DIAGNOSIS — I10 ESSENTIAL (PRIMARY) HYPERTENSION: ICD-10-CM

## 2024-08-20 DIAGNOSIS — E78.2 MIXED HYPERLIPIDEMIA: ICD-10-CM

## 2024-08-20 DIAGNOSIS — E11.9 TYPE 2 DIABETES MELLITUS WITHOUT COMPLICATION, WITH LONG-TERM CURRENT USE OF INSULIN (MULTI): ICD-10-CM

## 2024-08-20 DIAGNOSIS — K21.9 GASTROESOPHAGEAL REFLUX DISEASE WITHOUT ESOPHAGITIS: ICD-10-CM

## 2024-08-20 DIAGNOSIS — Z12.5 PROSTATE CANCER SCREENING: ICD-10-CM

## 2024-08-20 DIAGNOSIS — R41.3 MEMORY LOSS: ICD-10-CM

## 2024-08-20 DIAGNOSIS — Z00.00 WELLNESS EXAMINATION: ICD-10-CM

## 2024-08-20 LAB
ALBUMIN SERPL BCP-MCNC: 4.5 G/DL (ref 3.4–5)
ALP SERPL-CCNC: 82 U/L (ref 33–136)
ALT SERPL W P-5'-P-CCNC: 10 U/L (ref 10–52)
ANION GAP SERPL CALC-SCNC: 15 MMOL/L (ref 10–20)
AST SERPL W P-5'-P-CCNC: 17 U/L (ref 9–39)
BILIRUB SERPL-MCNC: 0.9 MG/DL (ref 0–1.2)
BUN SERPL-MCNC: 16 MG/DL (ref 6–23)
CALCIUM SERPL-MCNC: 9.5 MG/DL (ref 8.6–10.6)
CHLORIDE SERPL-SCNC: 104 MMOL/L (ref 98–107)
CHOLEST SERPL-MCNC: 124 MG/DL (ref 0–199)
CHOLESTEROL/HDL RATIO: 2.7
CO2 SERPL-SCNC: 27 MMOL/L (ref 21–32)
CREAT SERPL-MCNC: 1.21 MG/DL (ref 0.5–1.3)
EGFRCR SERPLBLD CKD-EPI 2021: 63 ML/MIN/1.73M*2
ERYTHROCYTE [DISTWIDTH] IN BLOOD BY AUTOMATED COUNT: 13.8 % (ref 11.5–14.5)
GLUCOSE SERPL-MCNC: 162 MG/DL (ref 74–99)
HCT VFR BLD AUTO: 48.5 % (ref 41–52)
HDLC SERPL-MCNC: 46.7 MG/DL
HGB BLD-MCNC: 15.3 G/DL (ref 13.5–17.5)
LDLC SERPL CALC-MCNC: 57 MG/DL
MCH RBC QN AUTO: 26.2 PG (ref 26–34)
MCHC RBC AUTO-ENTMCNC: 31.5 G/DL (ref 32–36)
MCV RBC AUTO: 83 FL (ref 80–100)
NON HDL CHOLESTEROL: 77 MG/DL (ref 0–149)
NRBC BLD-RTO: 0 /100 WBCS (ref 0–0)
PLATELET # BLD AUTO: 271 X10*3/UL (ref 150–450)
POTASSIUM SERPL-SCNC: 4.2 MMOL/L (ref 3.5–5.3)
PROT SERPL-MCNC: 7.1 G/DL (ref 6.4–8.2)
PSA SERPL-MCNC: <0.1 NG/ML
RBC # BLD AUTO: 5.83 X10*6/UL (ref 4.5–5.9)
SODIUM SERPL-SCNC: 142 MMOL/L (ref 136–145)
TRIGL SERPL-MCNC: 102 MG/DL (ref 0–149)
TSH SERPL-ACNC: 3.33 MIU/L (ref 0.44–3.98)
VLDL: 20 MG/DL (ref 0–40)
WBC # BLD AUTO: 8.7 X10*3/UL (ref 4.4–11.3)

## 2024-08-20 PROCEDURE — 85027 COMPLETE CBC AUTOMATED: CPT

## 2024-08-20 PROCEDURE — 82043 UR ALBUMIN QUANTITATIVE: CPT

## 2024-08-20 PROCEDURE — 80061 LIPID PANEL: CPT

## 2024-08-20 PROCEDURE — G0103 PSA SCREENING: HCPCS

## 2024-08-20 PROCEDURE — 80053 COMPREHEN METABOLIC PANEL: CPT

## 2024-08-20 PROCEDURE — 84443 ASSAY THYROID STIM HORMONE: CPT

## 2024-08-20 PROCEDURE — 82570 ASSAY OF URINE CREATININE: CPT

## 2024-08-20 PROCEDURE — 36415 COLL VENOUS BLD VENIPUNCTURE: CPT

## 2024-08-21 LAB
CREAT UR-MCNC: 154.9 MG/DL (ref 20–370)
MICROALBUMIN UR-MCNC: 273.6 MG/L
MICROALBUMIN/CREAT UR: 176.6 UG/MG CREAT

## 2024-08-27 DIAGNOSIS — F41.9 ANXIETY: ICD-10-CM

## 2024-08-27 DIAGNOSIS — E78.2 MIXED HYPERLIPIDEMIA: Primary | ICD-10-CM

## 2024-08-27 DIAGNOSIS — E11.65 TYPE 2 DIABETES MELLITUS WITH HYPERGLYCEMIA, WITHOUT LONG-TERM CURRENT USE OF INSULIN (MULTI): ICD-10-CM

## 2024-08-27 DIAGNOSIS — I10 ESSENTIAL (PRIMARY) HYPERTENSION: ICD-10-CM

## 2024-08-28 RX ORDER — DULOXETIN HYDROCHLORIDE 60 MG/1
60 CAPSULE, DELAYED RELEASE ORAL DAILY
Qty: 90 CAPSULE | Refills: 1 | Status: SHIPPED | OUTPATIENT
Start: 2024-08-28

## 2024-08-28 RX ORDER — PANTOPRAZOLE SODIUM 40 MG/1
40 TABLET, DELAYED RELEASE ORAL
Qty: 90 TABLET | Refills: 1 | Status: SHIPPED | OUTPATIENT
Start: 2024-08-28

## 2024-08-28 RX ORDER — NAPROXEN SODIUM 220 MG/1
81 TABLET, FILM COATED ORAL DAILY
Qty: 90 TABLET | Refills: 1 | Status: SHIPPED | OUTPATIENT
Start: 2024-08-28

## 2024-08-28 RX ORDER — LISINOPRIL 20 MG/1
20 TABLET ORAL 2 TIMES DAILY
Qty: 180 TABLET | Refills: 1 | Status: SHIPPED | OUTPATIENT
Start: 2024-08-28

## 2024-08-28 RX ORDER — METFORMIN HYDROCHLORIDE 1000 MG/1
1000 TABLET ORAL
Qty: 90 TABLET | Refills: 1 | Status: SHIPPED | OUTPATIENT
Start: 2024-08-28

## 2024-08-28 RX ORDER — DONEPEZIL HYDROCHLORIDE 5 MG/1
5 TABLET, FILM COATED ORAL NIGHTLY
Qty: 90 TABLET | Refills: 1 | Status: SHIPPED | OUTPATIENT
Start: 2024-08-28

## 2024-08-28 RX ORDER — GLIMEPIRIDE 2 MG/1
2 TABLET ORAL
Qty: 90 TABLET | Refills: 1 | Status: SHIPPED | OUTPATIENT
Start: 2024-08-28

## 2024-09-18 DIAGNOSIS — E03.9 HYPOTHYROIDISM, UNSPECIFIED TYPE: ICD-10-CM

## 2024-09-18 RX ORDER — LEVOTHYROXINE SODIUM 25 UG/1
TABLET ORAL
Qty: 90 TABLET | Refills: 3 | Status: SHIPPED | OUTPATIENT
Start: 2024-09-18

## 2024-10-29 ENCOUNTER — TELEPHONE (OUTPATIENT)
Dept: PRIMARY CARE | Facility: CLINIC | Age: 74
End: 2024-10-29
Payer: MEDICARE

## 2024-11-05 ENCOUNTER — APPOINTMENT (OUTPATIENT)
Dept: PRIMARY CARE | Facility: CLINIC | Age: 74
End: 2024-11-05
Payer: MEDICARE

## 2024-11-05 VITALS
HEIGHT: 69 IN | OXYGEN SATURATION: 97 % | WEIGHT: 228 LBS | SYSTOLIC BLOOD PRESSURE: 180 MMHG | TEMPERATURE: 97.3 F | BODY MASS INDEX: 33.77 KG/M2 | HEART RATE: 90 BPM | DIASTOLIC BLOOD PRESSURE: 100 MMHG

## 2024-11-05 DIAGNOSIS — E78.2 MIXED HYPERLIPIDEMIA: ICD-10-CM

## 2024-11-05 DIAGNOSIS — E11.40 TYPE 2 DIABETES MELLITUS WITH DIABETIC NEUROPATHY, WITH LONG-TERM CURRENT USE OF INSULIN: ICD-10-CM

## 2024-11-05 DIAGNOSIS — E11.9 TYPE 2 DIABETES MELLITUS WITHOUT COMPLICATION, WITH LONG-TERM CURRENT USE OF INSULIN (MULTI): ICD-10-CM

## 2024-11-05 DIAGNOSIS — Z79.4 TYPE 2 DIABETES MELLITUS WITHOUT COMPLICATION, WITH LONG-TERM CURRENT USE OF INSULIN (MULTI): ICD-10-CM

## 2024-11-05 DIAGNOSIS — R00.2 PALPITATIONS: ICD-10-CM

## 2024-11-05 DIAGNOSIS — K21.9 GASTROESOPHAGEAL REFLUX DISEASE WITHOUT ESOPHAGITIS: ICD-10-CM

## 2024-11-05 DIAGNOSIS — Z00.00 WELLNESS EXAMINATION: ICD-10-CM

## 2024-11-05 DIAGNOSIS — Z00.00 MEDICARE ANNUAL WELLNESS VISIT, SUBSEQUENT: Primary | ICD-10-CM

## 2024-11-05 DIAGNOSIS — Z85.46 PERSONAL HISTORY OF MALIGNANT NEOPLASM OF PROSTATE: ICD-10-CM

## 2024-11-05 DIAGNOSIS — E11.65 TYPE 2 DIABETES MELLITUS WITH HYPERGLYCEMIA, WITHOUT LONG-TERM CURRENT USE OF INSULIN: ICD-10-CM

## 2024-11-05 DIAGNOSIS — M54.42 CHRONIC LEFT-SIDED LOW BACK PAIN WITH LEFT-SIDED SCIATICA: ICD-10-CM

## 2024-11-05 DIAGNOSIS — Z12.5 PROSTATE CANCER SCREENING: ICD-10-CM

## 2024-11-05 DIAGNOSIS — F03.90 DEMENTIA WITHOUT BEHAVIORAL DISTURBANCE, PSYCHOTIC DISTURBANCE, MOOD DISTURBANCE, OR ANXIETY, UNSPECIFIED DEMENTIA SEVERITY, UNSPECIFIED DEMENTIA TYPE: ICD-10-CM

## 2024-11-05 DIAGNOSIS — M25.552 LEFT HIP PAIN: ICD-10-CM

## 2024-11-05 DIAGNOSIS — D12.6 TUBULAR ADENOMA OF COLON: ICD-10-CM

## 2024-11-05 DIAGNOSIS — R41.3 MEMORY LOSS: ICD-10-CM

## 2024-11-05 DIAGNOSIS — G89.29 CHRONIC LEFT-SIDED LOW BACK PAIN WITH LEFT-SIDED SCIATICA: ICD-10-CM

## 2024-11-05 DIAGNOSIS — R26.89 BALANCE PROBLEM: ICD-10-CM

## 2024-11-05 DIAGNOSIS — R52 PAIN: ICD-10-CM

## 2024-11-05 DIAGNOSIS — I10 ESSENTIAL (PRIMARY) HYPERTENSION: ICD-10-CM

## 2024-11-05 DIAGNOSIS — C61 MALIGNANT NEOPLASM OF PROSTATE (MULTI): ICD-10-CM

## 2024-11-05 DIAGNOSIS — M17.12 LOCALIZED OSTEOARTHRITIS OF LEFT KNEE: ICD-10-CM

## 2024-11-05 DIAGNOSIS — Z79.4 TYPE 2 DIABETES MELLITUS WITH DIABETIC NEUROPATHY, WITH LONG-TERM CURRENT USE OF INSULIN: ICD-10-CM

## 2024-11-05 DIAGNOSIS — E66.812 CLASS 2 SEVERE OBESITY DUE TO EXCESS CALORIES WITH SERIOUS COMORBIDITY AND BODY MASS INDEX (BMI) OF 36.0 TO 36.9 IN ADULT: ICD-10-CM

## 2024-11-05 DIAGNOSIS — E66.01 CLASS 2 SEVERE OBESITY DUE TO EXCESS CALORIES WITH SERIOUS COMORBIDITY AND BODY MASS INDEX (BMI) OF 36.0 TO 36.9 IN ADULT: ICD-10-CM

## 2024-11-05 DIAGNOSIS — I72.8 ANEURYSM OF OTHER SPECIFIED ARTERIES: ICD-10-CM

## 2024-11-05 DIAGNOSIS — F41.9 ANXIETY: ICD-10-CM

## 2024-11-05 DIAGNOSIS — B00.9 HERPESVIRAL INFECTION, UNSPECIFIED: ICD-10-CM

## 2024-11-05 DIAGNOSIS — R94.31 ABNORMAL ECG: ICD-10-CM

## 2024-11-05 LAB — POC HEMOGLOBIN A1C: 6.4 % (ref 4.2–6.5)

## 2024-11-05 PROCEDURE — 1170F FXNL STATUS ASSESSED: CPT | Performed by: INTERNAL MEDICINE

## 2024-11-05 PROCEDURE — 3080F DIAST BP >= 90 MM HG: CPT | Performed by: INTERNAL MEDICINE

## 2024-11-05 PROCEDURE — 3048F LDL-C <100 MG/DL: CPT | Performed by: INTERNAL MEDICINE

## 2024-11-05 PROCEDURE — 99397 PER PM REEVAL EST PAT 65+ YR: CPT | Performed by: INTERNAL MEDICINE

## 2024-11-05 PROCEDURE — G0439 PPPS, SUBSEQ VISIT: HCPCS | Performed by: INTERNAL MEDICINE

## 2024-11-05 PROCEDURE — 3060F POS MICROALBUMINURIA REV: CPT | Performed by: INTERNAL MEDICINE

## 2024-11-05 PROCEDURE — 83036 HEMOGLOBIN GLYCOSYLATED A1C: CPT | Performed by: INTERNAL MEDICINE

## 2024-11-05 PROCEDURE — 3008F BODY MASS INDEX DOCD: CPT | Performed by: INTERNAL MEDICINE

## 2024-11-05 PROCEDURE — G0008 ADMIN INFLUENZA VIRUS VAC: HCPCS | Performed by: INTERNAL MEDICINE

## 2024-11-05 PROCEDURE — 1160F RVW MEDS BY RX/DR IN RCRD: CPT | Performed by: INTERNAL MEDICINE

## 2024-11-05 PROCEDURE — 3077F SYST BP >= 140 MM HG: CPT | Performed by: INTERNAL MEDICINE

## 2024-11-05 PROCEDURE — 4010F ACE/ARB THERAPY RXD/TAKEN: CPT | Performed by: INTERNAL MEDICINE

## 2024-11-05 PROCEDURE — 99213 OFFICE O/P EST LOW 20 MIN: CPT | Performed by: INTERNAL MEDICINE

## 2024-11-05 PROCEDURE — 90662 IIV NO PRSV INCREASED AG IM: CPT | Performed by: INTERNAL MEDICINE

## 2024-11-05 PROCEDURE — 1124F ACP DISCUSS-NO DSCNMKR DOCD: CPT | Performed by: INTERNAL MEDICINE

## 2024-11-05 PROCEDURE — 1159F MED LIST DOCD IN RCRD: CPT | Performed by: INTERNAL MEDICINE

## 2024-11-05 RX ORDER — DONEPEZIL HYDROCHLORIDE 10 MG/1
10 TABLET, FILM COATED ORAL NIGHTLY
Qty: 90 TABLET | Refills: 3 | Status: SHIPPED | OUTPATIENT
Start: 2024-11-05

## 2024-11-05 RX ORDER — ATORVASTATIN CALCIUM 20 MG/1
20 TABLET, FILM COATED ORAL DAILY
Qty: 90 TABLET | Refills: 3 | Status: SHIPPED | OUTPATIENT
Start: 2024-11-05

## 2024-11-05 RX ORDER — CETIRIZINE HYDROCHLORIDE 10 MG/1
TABLET, CHEWABLE ORAL DAILY
COMMUNITY

## 2024-11-05 ASSESSMENT — ENCOUNTER SYMPTOMS
UNEXPECTED WEIGHT CHANGE: 0
CHILLS: 0
CHOKING: 0
GASTROINTESTINAL NEGATIVE: 1
ARTHRALGIAS: 0
CHEST TIGHTNESS: 0
APNEA: 0
APPETITE CHANGE: 0
HEADACHES: 0
EYE REDNESS: 0
HEMATOLOGIC/LYMPHATIC NEGATIVE: 1
PALPITATIONS: 0
FACIAL ASYMMETRY: 0
ENDOCRINE NEGATIVE: 1
WHEEZING: 0
PHOTOPHOBIA: 0
EYE ITCHING: 0
FATIGUE: 0
STRIDOR: 0
DIAPHORESIS: 0
SHORTNESS OF BREATH: 0
EYE PAIN: 0
COUGH: 0
NUMBNESS: 0
FEVER: 0
PSYCHIATRIC NEGATIVE: 1
ACTIVITY CHANGE: 0
EYE DISCHARGE: 0
DIZZINESS: 0
LIGHT-HEADEDNESS: 0
ALLERGIC/IMMUNOLOGIC NEGATIVE: 1

## 2024-11-05 ASSESSMENT — ACTIVITIES OF DAILY LIVING (ADL)
DOING_HOUSEWORK: NEEDS ASSISTANCE
MANAGING_FINANCES: TOTAL CARE
BATHING: INDEPENDENT
TAKING_MEDICATION: NEEDS ASSISTANCE
DRESSING: INDEPENDENT
GROCERY_SHOPPING: NEEDS ASSISTANCE

## 2024-11-05 ASSESSMENT — PATIENT HEALTH QUESTIONNAIRE - PHQ9
SUM OF ALL RESPONSES TO PHQ9 QUESTIONS 1 AND 2: 0
2. FEELING DOWN, DEPRESSED OR HOPELESS: NOT AT ALL
1. LITTLE INTEREST OR PLEASURE IN DOING THINGS: NOT AT ALL

## 2024-11-05 NOTE — ASSESSMENT & PLAN NOTE
Orders:    POCT glycosylated hemoglobin (Hb A1C) manually resulted    Referral to Gastroenterology; Future

## 2024-11-05 NOTE — ASSESSMENT & PLAN NOTE
Needs colon 2025  Orders:    POCT glycosylated hemoglobin (Hb A1C) manually resulted    Referral to Gastroenterology; Future

## 2024-11-05 NOTE — ASSESSMENT & PLAN NOTE
good  Orders:    POCT glycosylated hemoglobin (Hb A1C) manually resulted    Referral to Gastroenterology; Future    donepezil (Aricept) 10 mg tablet; Take 1 tablet (10 mg) by mouth once daily at bedtime.

## 2024-11-05 NOTE — ASSESSMENT & PLAN NOTE
Sign see neuro   Orders:    POCT glycosylated hemoglobin (Hb A1C) manually resulted    Referral to Gastroenterology; Future

## 2024-11-05 NOTE — ASSESSMENT & PLAN NOTE
controlled  Orders:    donepezil (Aricept) 10 mg tablet; Take 1 tablet (10 mg) by mouth once daily at bedtime.

## 2024-11-05 NOTE — ASSESSMENT & PLAN NOTE
See psa  Orders:    POCT glycosylated hemoglobin (Hb A1C) manually resulted    Referral to Gastroenterology; Future

## 2024-11-05 NOTE — ASSESSMENT & PLAN NOTE
Stable now   Orders:    POCT glycosylated hemoglobin (Hb A1C) manually resulted    Referral to Gastroenterology; Future

## 2024-11-05 NOTE — PROGRESS NOTES
Subjective   Reason for Visit: Romie Iyer is an 74 y.o. male here for a Medicare Wellness visit.     Past Medical, Surgical, and Family History reviewed and updated in chart.    Reviewed all medications by prescribing practitioner or clinical pharmacist (such as prescriptions, OTCs, herbal therapies and supplements) and documented in the medical record.  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  Stable over all   Needs cataract and colon     PMHX, PSHX, ALL, SOCHX, PRE MED ALL REVIEWED     MMSE 26 30     PHQ 2 NL     NO FALLS NOTED     PREVENTIVE MEDICINE:  Mammogram  Dexa  Psa 2024  Colonoscopy   VACCINES REVIEWED    PSHx: prostatectomy, tonsillectomy, tooth extraction    PAST MEDICAL HISTORY:   PAST MEDICAL HISTORY   Diagnosis Date   Anxiety   Depression   Diabetes mellitus without complication (HCC)   Essential hypertension, benign   GERD (gastroesophageal reflux disease)   Malaise and fatigue   Mixed hyperlipidemia   ABRAN (obstructive sleep apnea)   no machine   Overweight   Paraesophageal hernia 11/09/2022   large   Prostate cancer (HCC)   in remission since 2007   Restless leg syndrome   Short-term memory loss   seeing neurologist   Syncope   2017   Type 2 diabetes mellitus with stage 2 chronic kidney disease (HCC)     PAST SURGICAL HISTORY:   PAST SURGICAL HISTORY   Procedure Laterality Date   CARPAL TUNNEL Bilateral   repaired   COLONOSCOPY SCREENING 06/21/2022   Dr. Crawley   ECHO 01/27/2017   EGD 11/09/2022   large hiatal hernia with Silvio's erosions; Dr. Crawley   EVENT MONITOR 02/09/2017   14 day   KNEE SURGERY HX Left   arthroscopy with cartilage removal/repair   PROSTATECTOMY FIRST STAGE   Transurethral 2007 for Prostate CA   TILT TABLE 01/27/2017   Dr. Tabares   TONSILLECTOMY HX   TOOTH EXTRACTION     FAMILY HISTORY:   FAMILY HISTORY   Problem Relation Age of Onset   Stroke Father   Cancer Mother   colo-rectal   Cancer Maternal Grandfather   colo-rectal   other  (Other- family history of colon cancer (mother diagnosed at age51), paternal uncle (diagnosed at age 70), and maternal grandfather) Other     SOCIAL HISTORY:   Social History     Tobacco Use   Smoking status: Never   Smokeless tobacco: Never   Vaping Use   Vaping Use: Never used   Substance Use Topics   Alcohol use: Not Currently   Drug use: Never     MEDICATIONS:  Current Outpatient Medications   Medication Sig   pantoprazole DR (PROTONIX) 40 mg tablet Take 1 tablet by mouth every morning.   glimepiride (AMARYL) 2 mg tablet Take 2 mg by mouth once daily.   BABY ASPIRIN ORAL Take by mouth once daily.   cyanocobalamin (VITAMIN B-12) 500 mcg tablet Take 1 tablet by mouth once daily.   DULoxetine (CYMBALTA) 60 mg capsule Take 60 mg by mouth once daily.   levothyroxine (SYNTHROID) 25 mcg tablet Take 25 mcg by mouth daily before breakfast.   lisinopril (ZESTRIL, PRINIVIL) 20 mg tablet Take 20 mg by mouth twice daily.   atorvastatin (LIPITOR) 40 mg tablet TAKE 1 TABLET BY MOUTH EVERY DAY (Patient taking differently: Take 20 mg by mouth once daily.)   metFORMIN (GLUCOPHAGE) 1,000 mg tablet Take 1 tablet by mouth twice daily with meals. (Patient taking differently: Take 1,000 mg by mouth daily with dinner.)   atenolol-chlorthalidone (TENORETIC) 50-25 mg per tablet Take 1 tablet by mouth once daily.   (Patient not taking: No sig reported)     No current facility-administered medications for this visit.     ALLERGIES:   ALLERGIES   Allergen Reactions   Seasonal Allergies Shortness of Breath     Patient Care Team:  Ponce Saez MD as PCP - General  Ponce Saez MD as PCP - [employer] PCP     Review of Systems   Constitutional:  Negative for activity change, appetite change, chills, diaphoresis, fatigue, fever and unexpected weight change.   HENT: Negative.     Eyes:  Negative for photophobia, pain, discharge, redness, itching and visual disturbance.   Respiratory:  Negative for apnea, cough, choking, chest  "tightness, shortness of breath, wheezing and stridor.    Cardiovascular:  Negative for chest pain, palpitations and leg swelling.   Gastrointestinal: Negative.    Endocrine: Negative.    Genitourinary: Negative.    Musculoskeletal:  Negative for arthralgias.   Skin: Negative.    Allergic/Immunologic: Negative.    Neurological:  Negative for dizziness, facial asymmetry, light-headedness, numbness and headaches.   Hematological: Negative.    Psychiatric/Behavioral: Negative.         Objective   Vitals:  BP (!) 180/100 (BP Location: Left arm, Patient Position: Sitting, BP Cuff Size: Adult)   Pulse 90   Temp 36.3 °C (97.3 °F) (Temporal)   Ht 1.753 m (5' 9\")   Wt 103 kg (228 lb)   SpO2 97%   BMI 33.67 kg/m²       Physical Exam  Constitutional:       Appearance: Normal appearance. He is obese.   HENT:      Head: Normocephalic and atraumatic.      Right Ear: Tympanic membrane normal.      Left Ear: Tympanic membrane normal.      Nose: Nose normal.   Eyes:      Extraocular Movements: Extraocular movements intact.      Conjunctiva/sclera: Conjunctivae normal.      Pupils: Pupils are equal, round, and reactive to light.   Cardiovascular:      Rate and Rhythm: Normal rate and regular rhythm.      Pulses: Normal pulses.      Heart sounds: Normal heart sounds.   Pulmonary:      Effort: Pulmonary effort is normal.      Breath sounds: Normal breath sounds.   Abdominal:      General: Abdomen is flat. Bowel sounds are normal.      Palpations: Abdomen is soft.   Musculoskeletal:         General: Normal range of motion.      Cervical back: Normal range of motion and neck supple.   Skin:     General: Skin is warm and dry.   Neurological:      General: No focal deficit present.      Mental Status: Mental status is at baseline. He is disoriented.   Psychiatric:         Mood and Affect: Mood normal.         Behavior: Behavior normal.         Thought Content: Thought content normal.         Judgment: Judgment normal.       Ear " Cerumen Removal    Date/Time: 11/6/2024 9:15 AM    Performed by: Ponce Saez MD  Authorized by: Ponce Saez MD    Consent:     Consent obtained:  Verbal    Consent given by:  Patient    Risks, benefits, and alternatives were discussed: yes      Risks discussed:  Bleeding, infection, dizziness, incomplete removal, TM perforation and pain    Alternatives discussed:  Alternative treatment  Universal protocol:     Procedure explained and questions answered to patient or proxy's satisfaction: yes      Relevant documents present and verified: yes      Test results available: yes      Imaging studies available: yes      Required blood products, implants, devices, and special equipment available: yes      Site/side marked: yes      Immediately prior to procedure, a time out was called: yes    Procedure details:     Location:  L ear and R ear    Procedure type: irrigation      Procedure outcomes: cerumen removed    Post-procedure details:     Inspection:  TM intact    Hearing quality:  Improved    Procedure completion:  Tolerated well, no immediate complications  Joint Injection Large/Arthrocentesis: L knee on 11/6/2024 9:15 AM  Indications: pain  Details: 21 G needle, anterolateral approach  Medications: 2.5 mg triamcinolone acetonide 40 mg/mL; 0.5 mL lidocaine 10 mg/mL (1 %)         Assessment & Plan  Medicare annual wellness visit, subsequent    Orders:    POCT glycosylated hemoglobin (Hb A1C) manually resulted    Referral to Gastroenterology; Future    Balance problem  Stable now   Orders:    POCT glycosylated hemoglobin (Hb A1C) manually resulted    Referral to Gastroenterology; Future    Pain    Orders:    POCT glycosylated hemoglobin (Hb A1C) manually resulted    Referral to Gastroenterology; Future    Gastroesophageal reflux disease without esophagitis  Good   Orders:    POCT glycosylated hemoglobin (Hb A1C) manually resulted    Referral to Gastroenterology; Future    Memory loss  Sign see neuro   Orders:     POCT glycosylated hemoglobin (Hb A1C) manually resulted    Referral to Gastroenterology; Future    Type 2 diabetes mellitus without complication, with long-term current use of insulin (Multi)  controlled  Orders:    POCT glycosylated hemoglobin (Hb A1C) manually resulted    Referral to Gastroenterology; Future    Palpitations    Orders:    POCT glycosylated hemoglobin (Hb A1C) manually resulted    Referral to Gastroenterology; Future    Mixed hyperlipidemia  See ;labs  Orders:    POCT glycosylated hemoglobin (Hb A1C) manually resulted    Referral to Gastroenterology; Future    atorvastatin (Lipitor) 20 mg tablet; Take 1 tablet (20 mg) by mouth once daily.    donepezil (Aricept) 10 mg tablet; Take 1 tablet (10 mg) by mouth once daily at bedtime.    Herpesviral infection, unspecified  resolved  Orders:    POCT glycosylated hemoglobin (Hb A1C) manually resulted    Referral to Gastroenterology; Future    Essential (primary) hypertension  good  Orders:    POCT glycosylated hemoglobin (Hb A1C) manually resulted    Referral to Gastroenterology; Future    donepezil (Aricept) 10 mg tablet; Take 1 tablet (10 mg) by mouth once daily at bedtime.    Abnormal ECG    Orders:    POCT glycosylated hemoglobin (Hb A1C) manually resulted    Referral to Gastroenterology; Future    Chronic left-sided low back pain with left-sided sciatica  Ok now  Orders:    POCT glycosylated hemoglobin (Hb A1C) manually resulted    Referral to Gastroenterology; Future    Left hip pain    Orders:    POCT glycosylated hemoglobin (Hb A1C) manually resulted    Referral to Gastroenterology; Future    Anxiety  good  Orders:    POCT glycosylated hemoglobin (Hb A1C) manually resulted    Referral to Gastroenterology; Future    donepezil (Aricept) 10 mg tablet; Take 1 tablet (10 mg) by mouth once daily at bedtime.    Prostate cancer screening    Orders:    POCT glycosylated hemoglobin (Hb A1C) manually resulted    Referral to Gastroenterology; Future    Wellness  examination    Orders:    POCT glycosylated hemoglobin (Hb A1C) manually resulted    Referral to Gastroenterology; Future    Personal history of malignant neoplasm of prostate  See psa  Orders:    POCT glycosylated hemoglobin (Hb A1C) manually resulted    Referral to Gastroenterology; Future    Tubular adenoma of colon  Needs colon 2025  Orders:    POCT glycosylated hemoglobin (Hb A1C) manually resulted    Referral to Gastroenterology; Future    Dementia without behavioral disturbance, psychotic disturbance, mood disturbance, or anxiety, unspecified dementia severity, unspecified dementia type  Stable fair        Aneurysm of other specified arteries  Stable        Malignant neoplasm of prostate (Multi)  Good psa       Class 2 severe obesity due to excess calories with serious comorbidity and body mass index (BMI) of 36.0 to 36.9 in adult  Diet cardio low carb       Type 2 diabetes mellitus with diabetic neuropathy, with long-term current use of insulin  See noted        Type 2 diabetes mellitus with hyperglycemia, without long-term current use of insulin  controlled  Orders:    donepezil (Aricept) 10 mg tablet; Take 1 tablet (10 mg) by mouth once daily at bedtime.    Localized osteoarthritis of left knee

## 2024-11-05 NOTE — ASSESSMENT & PLAN NOTE
Ok now  Orders:    POCT glycosylated hemoglobin (Hb A1C) manually resulted    Referral to Gastroenterology; Future

## 2024-11-05 NOTE — ASSESSMENT & PLAN NOTE
controlled  Orders:    POCT glycosylated hemoglobin (Hb A1C) manually resulted    Referral to Gastroenterology; Future

## 2024-11-05 NOTE — ASSESSMENT & PLAN NOTE
Good   Orders:    POCT glycosylated hemoglobin (Hb A1C) manually resulted    Referral to Gastroenterology; Future

## 2024-11-05 NOTE — ASSESSMENT & PLAN NOTE
resolved  Orders:    POCT glycosylated hemoglobin (Hb A1C) manually resulted    Referral to Gastroenterology; Future

## 2024-11-05 NOTE — ASSESSMENT & PLAN NOTE
See ;labs  Orders:    POCT glycosylated hemoglobin (Hb A1C) manually resulted    Referral to Gastroenterology; Future    atorvastatin (Lipitor) 20 mg tablet; Take 1 tablet (20 mg) by mouth once daily.    donepezil (Aricept) 10 mg tablet; Take 1 tablet (10 mg) by mouth once daily at bedtime.

## 2024-11-06 PROCEDURE — 20610 DRAIN/INJ JOINT/BURSA W/O US: CPT | Performed by: INTERNAL MEDICINE

## 2024-11-06 PROCEDURE — 69209 REMOVE IMPACTED EAR WAX UNI: CPT | Performed by: INTERNAL MEDICINE

## 2024-11-06 RX ORDER — LIDOCAINE HYDROCHLORIDE 10 MG/ML
0.5 INJECTION, SOLUTION INFILTRATION; PERINEURAL
Status: COMPLETED | OUTPATIENT
Start: 2024-11-06 | End: 2024-11-06

## 2024-11-06 RX ORDER — TRIAMCINOLONE ACETONIDE 40 MG/ML
2.5 INJECTION, SUSPENSION INTRA-ARTICULAR; INTRAMUSCULAR
Status: COMPLETED | OUTPATIENT
Start: 2024-11-06 | End: 2024-11-06

## 2024-11-20 DIAGNOSIS — E78.2 MIXED HYPERLIPIDEMIA: ICD-10-CM

## 2024-11-20 RX ORDER — ATORVASTATIN CALCIUM 20 MG/1
TABLET, FILM COATED ORAL
Qty: 90 TABLET | Refills: 3 | Status: SHIPPED | OUTPATIENT
Start: 2024-11-20

## 2024-12-19 DIAGNOSIS — E11.9 TYPE 2 DIABETES MELLITUS WITHOUT COMPLICATION, WITHOUT LONG-TERM CURRENT USE OF INSULIN (MULTI): ICD-10-CM

## 2024-12-19 RX ORDER — BLOOD-GLUCOSE SENSOR
EACH MISCELLANEOUS
Qty: 9 EACH | Refills: 3 | Status: SHIPPED | OUTPATIENT
Start: 2024-12-19

## 2025-02-14 ENCOUNTER — OFFICE VISIT (OUTPATIENT)
Dept: PRIMARY CARE | Facility: CLINIC | Age: 75
End: 2025-02-14
Payer: MEDICARE

## 2025-02-14 VITALS
TEMPERATURE: 96.9 F | DIASTOLIC BLOOD PRESSURE: 90 MMHG | BODY MASS INDEX: 34.41 KG/M2 | SYSTOLIC BLOOD PRESSURE: 150 MMHG | HEART RATE: 90 BPM | WEIGHT: 233 LBS | RESPIRATION RATE: 16 BRPM

## 2025-02-14 DIAGNOSIS — E11.65 TYPE 2 DIABETES MELLITUS WITH HYPERGLYCEMIA, WITHOUT LONG-TERM CURRENT USE OF INSULIN: Primary | ICD-10-CM

## 2025-02-14 DIAGNOSIS — N39.0 URINARY TRACT INFECTION WITHOUT HEMATURIA, SITE UNSPECIFIED: ICD-10-CM

## 2025-02-14 DIAGNOSIS — R41.3 MEMORY LOSS: ICD-10-CM

## 2025-02-14 DIAGNOSIS — R82.81 PYURIA: ICD-10-CM

## 2025-02-14 LAB
POC APPEARANCE, URINE: ABNORMAL
POC BILIRUBIN, URINE: ABNORMAL
POC BLOOD, URINE: NEGATIVE
POC COLOR, URINE: YELLOW
POC FINGERSTICK BLOOD GLUCOSE: 150 MG/DL (ref 70–100)
POC GLUCOSE, URINE: NEGATIVE MG/DL
POC HEMOGLOBIN A1C: 6.6 % (ref 4.2–6.5)
POC KETONES, URINE: ABNORMAL MG/DL
POC LEUKOCYTES, URINE: NEGATIVE
POC NITRITE,URINE: NEGATIVE
POC PH, URINE: 6 PH
POC PROTEIN, URINE: ABNORMAL MG/DL
POC SPECIFIC GRAVITY, URINE: >=1.03
POC UROBILINOGEN, URINE: 1 EU/DL

## 2025-02-14 PROCEDURE — 99214 OFFICE O/P EST MOD 30 MIN: CPT | Performed by: NURSE PRACTITIONER

## 2025-02-14 PROCEDURE — 3077F SYST BP >= 140 MM HG: CPT | Performed by: NURSE PRACTITIONER

## 2025-02-14 PROCEDURE — 3080F DIAST BP >= 90 MM HG: CPT | Performed by: NURSE PRACTITIONER

## 2025-02-14 PROCEDURE — 82962 GLUCOSE BLOOD TEST: CPT | Performed by: NURSE PRACTITIONER

## 2025-02-14 PROCEDURE — 4010F ACE/ARB THERAPY RXD/TAKEN: CPT | Performed by: NURSE PRACTITIONER

## 2025-02-14 PROCEDURE — 81002 URINALYSIS NONAUTO W/O SCOPE: CPT | Performed by: NURSE PRACTITIONER

## 2025-02-14 PROCEDURE — 83036 HEMOGLOBIN GLYCOSYLATED A1C: CPT | Performed by: NURSE PRACTITIONER

## 2025-02-14 RX ORDER — DOXYCYCLINE 100 MG/1
100 CAPSULE ORAL 2 TIMES DAILY
Qty: 20 CAPSULE | Refills: 0 | Status: SHIPPED | OUTPATIENT
Start: 2025-02-14 | End: 2025-02-24

## 2025-02-14 ASSESSMENT — ENCOUNTER SYMPTOMS
NEUROLOGICAL NEGATIVE: 1
CONSTITUTIONAL NEGATIVE: 1
MUSCULOSKELETAL NEGATIVE: 1
GASTROINTESTINAL NEGATIVE: 1
ENDOCRINE NEGATIVE: 1
PSYCHIATRIC NEGATIVE: 1
RESPIRATORY NEGATIVE: 1
CARDIOVASCULAR NEGATIVE: 1

## 2025-02-14 NOTE — PROGRESS NOTES
Subjective   Patient ID: Romie Iyer is a 74 y.o. male.    HPI  Acute visit coverage for dr lara - c/o possible uti.   He is here with his ex wife Ann Marie who looks after him, he has dementia, and pt lives with his son una - who manages his pills, etc.     They are established already with dr escobar[facility]. For neurology. Has had the cognitive testing, etc.    Ann Marie and una have noted some behavior changes, and in past it was a uti. In past, he would not want to bath, and he would not take his pill one day, etc. They are managing, and they have been referred back to the geriatric mgmt/neurology team for further support, as over the last months it seems by the stories, more repetative questioning per son Una, etc that his memory unfort may be getting worse. She also asked me to ask him not to drive, he was told not to. Taking keys was also a suggestion.   He has no urinary complaints, maybe a bit more leaking (he wears a urinary diaper), maybe a weaker stream. History is difficult. Ex-Wife says malodor, and dark yellow, no blood per pt.  Pt says he freely admits too much coffee, not enough water. He will work on that. Otherwise, he feels well. No chills, fever or pain. Urgency and hesitency. No blood. Hx of prostate cancer and has urologist. Psa check is recent at his physcial with pcp and neg 8/24.      He is diabetic. Aic 6.6 today and under goal - last 6.4, random bs 150.     I will have to take advice from those who know him best, with change of behavior etc will treat. Otherwise, we discussed they are to make an appt with neurology/geriatric NP for further intervention. If urine culture negative, it is time to ? Add on namenda, etc. It appears the family is ready for additional support.       Review of Systems   Constitutional: Negative.    HENT: Negative.     Respiratory: Negative.     Cardiovascular: Negative.    Gastrointestinal: Negative.    Endocrine: Negative.    Genitourinary: Negative.     Musculoskeletal: Negative.    Skin: Negative.    Neurological: Negative.    Psychiatric/Behavioral: Negative.       Objective   Physical Exam  Vitals and nursing note reviewed.   Constitutional:       Appearance: Normal appearance.   HENT:      Head: Normocephalic.   Eyes:      Pupils: Pupils are equal, round, and reactive to light.   Cardiovascular:      Rate and Rhythm: Normal rate and regular rhythm.      Heart sounds: Normal heart sounds.   Pulmonary:      Effort: Pulmonary effort is normal.      Breath sounds: Normal breath sounds.   Skin:     General: Skin is warm and dry.   Neurological:      General: No focal deficit present.      Mental Status: He is alert and oriented to person, place, and time. Mental status is at baseline.   Psychiatric:         Mood and Affect: Mood normal.         Behavior: Behavior normal.         Thought Content: Thought content normal.         Judgment: Judgment normal.     Talkative. Friendly. Forgetful. Will look at Ann Marie for definitive answers to questions asked.     Problem List Items Addressed This Visit             ICD-10-CM    Type 2 diabetes mellitus - Primary E11.9    Relevant Orders    POCT UA (nonautomated) manually resulted (Completed)    POCT glycosylated hemoglobin (Hb A1C) manually resulted (Completed)    POCT fingerstick glucose manually resulted (Completed)    Urine Culture    Memory loss R41.3     Other Visit Diagnoses         Codes    Pyuria     R82.81    Relevant Orders    Urine Culture    Urinary tract infection without hematuria, site unspecified     N39.0    Relevant Medications    doxycycline (Vibramycin) 100 mg capsule

## 2025-02-16 LAB — BACTERIA UR CULT: NORMAL

## 2025-03-02 DIAGNOSIS — I10 ESSENTIAL (PRIMARY) HYPERTENSION: ICD-10-CM

## 2025-03-03 RX ORDER — LISINOPRIL 20 MG/1
20 TABLET ORAL 2 TIMES DAILY
Qty: 180 TABLET | Refills: 3 | Status: SHIPPED | OUTPATIENT
Start: 2025-03-03

## 2025-03-31 DIAGNOSIS — E78.2 MIXED HYPERLIPIDEMIA: ICD-10-CM

## 2025-03-31 DIAGNOSIS — F41.9 ANXIETY: ICD-10-CM

## 2025-03-31 DIAGNOSIS — E11.65 TYPE 2 DIABETES MELLITUS WITH HYPERGLYCEMIA, WITHOUT LONG-TERM CURRENT USE OF INSULIN: ICD-10-CM

## 2025-03-31 DIAGNOSIS — I10 ESSENTIAL (PRIMARY) HYPERTENSION: ICD-10-CM

## 2025-03-31 RX ORDER — DULOXETIN HYDROCHLORIDE 60 MG/1
60 CAPSULE, DELAYED RELEASE ORAL DAILY
Qty: 90 CAPSULE | Refills: 3 | Status: SHIPPED | OUTPATIENT
Start: 2025-03-31

## 2025-03-31 RX ORDER — NAPROXEN SODIUM 220 MG/1
TABLET, FILM COATED ORAL
Qty: 90 TABLET | Refills: 3 | Status: SHIPPED | OUTPATIENT
Start: 2025-03-31

## 2025-03-31 RX ORDER — METFORMIN HYDROCHLORIDE 1000 MG/1
TABLET ORAL
Qty: 90 TABLET | Refills: 3 | Status: SHIPPED | OUTPATIENT
Start: 2025-03-31

## 2025-05-06 ENCOUNTER — APPOINTMENT (OUTPATIENT)
Dept: PRIMARY CARE | Facility: CLINIC | Age: 75
End: 2025-05-06
Payer: MEDICARE

## 2025-05-12 ENCOUNTER — APPOINTMENT (OUTPATIENT)
Dept: PRIMARY CARE | Facility: CLINIC | Age: 75
End: 2025-05-12
Payer: MEDICARE

## 2025-06-10 ENCOUNTER — APPOINTMENT (OUTPATIENT)
Dept: PRIMARY CARE | Facility: CLINIC | Age: 75
End: 2025-06-10
Payer: MEDICARE

## 2025-06-10 VITALS
HEIGHT: 69 IN | DIASTOLIC BLOOD PRESSURE: 86 MMHG | HEART RATE: 95 BPM | TEMPERATURE: 96.9 F | OXYGEN SATURATION: 97 % | WEIGHT: 239 LBS | SYSTOLIC BLOOD PRESSURE: 136 MMHG | BODY MASS INDEX: 35.4 KG/M2

## 2025-06-10 DIAGNOSIS — Z00.00 WELLNESS EXAMINATION: ICD-10-CM

## 2025-06-10 DIAGNOSIS — E03.9 HYPOTHYROIDISM, UNSPECIFIED TYPE: ICD-10-CM

## 2025-06-10 DIAGNOSIS — I10 ESSENTIAL (PRIMARY) HYPERTENSION: ICD-10-CM

## 2025-06-10 DIAGNOSIS — H11.32 SUBCONJUNCTIVAL BLEED, LEFT: Primary | ICD-10-CM

## 2025-06-10 DIAGNOSIS — E11.40 TYPE 2 DIABETES MELLITUS WITH DIABETIC NEUROPATHY, WITH LONG-TERM CURRENT USE OF INSULIN: ICD-10-CM

## 2025-06-10 DIAGNOSIS — D12.6 TUBULAR ADENOMA OF COLON: ICD-10-CM

## 2025-06-10 DIAGNOSIS — Z79.4 TYPE 2 DIABETES MELLITUS WITH DIABETIC NEUROPATHY, WITH LONG-TERM CURRENT USE OF INSULIN: ICD-10-CM

## 2025-06-10 DIAGNOSIS — E11.65 TYPE 2 DIABETES MELLITUS WITH HYPERGLYCEMIA, WITHOUT LONG-TERM CURRENT USE OF INSULIN: ICD-10-CM

## 2025-06-10 LAB — POC HEMOGLOBIN A1C: 6.9 % (ref 4.2–6.5)

## 2025-06-10 PROCEDURE — 1159F MED LIST DOCD IN RCRD: CPT | Performed by: INTERNAL MEDICINE

## 2025-06-10 PROCEDURE — 3075F SYST BP GE 130 - 139MM HG: CPT | Performed by: INTERNAL MEDICINE

## 2025-06-10 PROCEDURE — 1160F RVW MEDS BY RX/DR IN RCRD: CPT | Performed by: INTERNAL MEDICINE

## 2025-06-10 PROCEDURE — 4010F ACE/ARB THERAPY RXD/TAKEN: CPT | Performed by: INTERNAL MEDICINE

## 2025-06-10 PROCEDURE — 99214 OFFICE O/P EST MOD 30 MIN: CPT | Performed by: INTERNAL MEDICINE

## 2025-06-10 PROCEDURE — 3079F DIAST BP 80-89 MM HG: CPT | Performed by: INTERNAL MEDICINE

## 2025-06-10 PROCEDURE — 3008F BODY MASS INDEX DOCD: CPT | Performed by: INTERNAL MEDICINE

## 2025-06-10 PROCEDURE — 3044F HG A1C LEVEL LT 7.0%: CPT | Performed by: INTERNAL MEDICINE

## 2025-06-10 PROCEDURE — 83036 HEMOGLOBIN GLYCOSYLATED A1C: CPT | Performed by: INTERNAL MEDICINE

## 2025-06-10 RX ORDER — LEVOTHYROXINE SODIUM 25 UG/1
25 TABLET ORAL DAILY
Qty: 90 TABLET | Refills: 1 | Status: SHIPPED | OUTPATIENT
Start: 2025-06-10

## 2025-06-10 RX ORDER — GLIMEPIRIDE 2 MG/1
2 TABLET ORAL
Qty: 90 TABLET | Refills: 1 | Status: SHIPPED | OUTPATIENT
Start: 2025-06-10

## 2025-06-10 ASSESSMENT — PATIENT HEALTH QUESTIONNAIRE - PHQ9
SUM OF ALL RESPONSES TO PHQ9 QUESTIONS 1 AND 2: 0
1. LITTLE INTEREST OR PLEASURE IN DOING THINGS: NOT AT ALL
2. FEELING DOWN, DEPRESSED OR HOPELESS: NOT AT ALL

## 2025-06-10 NOTE — PROGRESS NOTES
"Subjective   Patient ID: Romie Iyer is a 74 y.o. male who presents for Follow-up (6 mo).    HPI sub conjunct hematoma  Left eye x 24 hours   No trauma he says   No strain   Sugars are labile      Review of Systems    Objective   /86 (BP Location: Left arm, Patient Position: Sitting, BP Cuff Size: Adult)   Pulse 95   Temp 36.1 °C (96.9 °F) (Temporal)   Ht 1.753 m (5' 9\")   Wt 108 kg (239 lb)   SpO2 97%   BMI 35.29 kg/m²     Physical Exam  Subconjunctival hematoma   NAD  CARD RRR  PULM CTA  ABD NEG   EXT  NL    Assessment/Plan   Diagnoses and all orders for this visit:  Subconjunctival bleed, left  -     TSH with reflex to Free T4 if abnormal; Future  -     Comprehensive metabolic panel; Future  Essential (primary) hypertension  -     TSH with reflex to Free T4 if abnormal; Future  -     Comprehensive metabolic panel; Future  Hypothyroidism, unspecified type  -     levothyroxine (Synthroid, Levoxyl) 25 mcg tablet; Take 1 tablet (25 mcg) by mouth early in the morning.. Take on an empty stomach at the same time each day, either 30 to 60 minutes prior to breakfast  -     TSH with reflex to Free T4 if abnormal; Future  -     Comprehensive metabolic panel; Future  Type 2 diabetes mellitus with hyperglycemia, without long-term current use of insulin  -     glimepiride (Amaryl) 2 mg tablet; Take 1 tablet (2 mg) by mouth once daily in the morning. Take before meals.  -     TSH with reflex to Free T4 if abnormal; Future  -     Comprehensive metabolic panel; Future  Type 2 diabetes mellitus with diabetic neuropathy, with long-term current use of insulin  -     TSH with reflex to Free T4 if abnormal; Future  -     Comprehensive metabolic panel; Future  Tubular adenoma of colon  -     TSH with reflex to Free T4 if abnormal; Future  -     Comprehensive metabolic panel; Future  Wellness examination  -     TSH with reflex to Free T4 if abnormal; Future  -     Lipid Panel; Future  -     Comprehensive metabolic panel; " Future

## 2025-07-09 DIAGNOSIS — E03.9 HYPOTHYROIDISM, UNSPECIFIED TYPE: ICD-10-CM

## 2025-07-09 RX ORDER — LEVOTHYROXINE SODIUM 25 UG/1
TABLET ORAL
Qty: 90 TABLET | Refills: 3 | Status: SHIPPED | OUTPATIENT
Start: 2025-07-09

## 2025-08-12 DIAGNOSIS — E11.9 TYPE 2 DIABETES MELLITUS WITHOUT COMPLICATION, WITHOUT LONG-TERM CURRENT USE OF INSULIN: ICD-10-CM

## 2025-08-13 RX ORDER — BLOOD-GLUCOSE TRANSMITTER
EACH MISCELLANEOUS
Qty: 1 EACH | Refills: 5 | Status: SHIPPED | OUTPATIENT
Start: 2025-08-13

## 2025-12-11 ENCOUNTER — APPOINTMENT (OUTPATIENT)
Dept: PRIMARY CARE | Facility: CLINIC | Age: 75
End: 2025-12-11
Payer: MEDICARE